# Patient Record
Sex: FEMALE | Race: WHITE
[De-identification: names, ages, dates, MRNs, and addresses within clinical notes are randomized per-mention and may not be internally consistent; named-entity substitution may affect disease eponyms.]

---

## 2017-04-17 ENCOUNTER — HOSPITAL ENCOUNTER (INPATIENT)
Dept: HOSPITAL 35 - ER | Age: 82
LOS: 7 days | Discharge: ANOTHER HOSPITAL | DRG: 190 | End: 2017-04-24
Attending: INTERNAL MEDICINE | Admitting: INTERNAL MEDICINE
Payer: COMMERCIAL

## 2017-04-17 VITALS — DIASTOLIC BLOOD PRESSURE: 57 MMHG | SYSTOLIC BLOOD PRESSURE: 183 MMHG

## 2017-04-17 VITALS — SYSTOLIC BLOOD PRESSURE: 170 MMHG | DIASTOLIC BLOOD PRESSURE: 61 MMHG

## 2017-04-17 VITALS — DIASTOLIC BLOOD PRESSURE: 51 MMHG | SYSTOLIC BLOOD PRESSURE: 150 MMHG

## 2017-04-17 VITALS — SYSTOLIC BLOOD PRESSURE: 183 MMHG | DIASTOLIC BLOOD PRESSURE: 59 MMHG

## 2017-04-17 VITALS — BODY MASS INDEX: 31.63 KG/M2 | HEIGHT: 62.99 IN | WEIGHT: 178.5 LBS

## 2017-04-17 DIAGNOSIS — J18.9: ICD-10-CM

## 2017-04-17 DIAGNOSIS — E44.1: ICD-10-CM

## 2017-04-17 DIAGNOSIS — Z86.73: ICD-10-CM

## 2017-04-17 DIAGNOSIS — E78.5: ICD-10-CM

## 2017-04-17 DIAGNOSIS — J45.901: ICD-10-CM

## 2017-04-17 DIAGNOSIS — E03.9: ICD-10-CM

## 2017-04-17 DIAGNOSIS — E11.9: ICD-10-CM

## 2017-04-17 DIAGNOSIS — K21.9: ICD-10-CM

## 2017-04-17 DIAGNOSIS — I50.32: ICD-10-CM

## 2017-04-17 DIAGNOSIS — Z88.2: ICD-10-CM

## 2017-04-17 DIAGNOSIS — G89.29: ICD-10-CM

## 2017-04-17 DIAGNOSIS — F32.9: ICD-10-CM

## 2017-04-17 DIAGNOSIS — Z88.1: ICD-10-CM

## 2017-04-17 DIAGNOSIS — Z88.0: ICD-10-CM

## 2017-04-17 DIAGNOSIS — I11.0: ICD-10-CM

## 2017-04-17 DIAGNOSIS — Z88.6: ICD-10-CM

## 2017-04-17 DIAGNOSIS — J44.1: ICD-10-CM

## 2017-04-17 DIAGNOSIS — J20.9: ICD-10-CM

## 2017-04-17 DIAGNOSIS — F41.9: ICD-10-CM

## 2017-04-17 DIAGNOSIS — J44.0: Primary | ICD-10-CM

## 2017-04-17 LAB
ALBUMIN SERPL-MCNC: 2.9 G/DL (ref 3.4–5)
ALP SERPL-CCNC: 59 U/L (ref 46–116)
ALT SERPL-CCNC: 9 U/L (ref 30–65)
ANION GAP SERPL CALC-SCNC: 13 MMOL/L (ref 7–16)
AST SERPL-CCNC: 11 U/L (ref 15–37)
BILIRUB SERPL-MCNC: 0.2 MG/DL
BUN SERPL-MCNC: 18 MG/DL (ref 7–18)
CALCIUM SERPL-MCNC: 9.7 MG/DL (ref 8.5–10.1)
CHLORIDE SERPL-SCNC: 105 MMOL/L (ref 98–107)
CO2 SERPL-SCNC: 19 MMOL/L (ref 21–32)
CREAT SERPL-MCNC: 1.1 MG/DL (ref 0.6–1)
EOSINOPHIL NFR BLD: 7 % (ref 0–3)
ERYTHROCYTE [DISTWIDTH] IN BLOOD BY AUTOMATED COUNT: 15.2 % (ref 10.5–14.5)
GLUCOSE SERPL-MCNC: 153 MG/DL (ref 74–106)
GRANULOCYTES NFR BLD MANUAL: 52 % (ref 36–66)
HCT VFR BLD CALC: 30.4 % (ref 37–47)
HGB BLD-MCNC: 10.2 GM/DL (ref 12–15)
LYMPHOCYTES NFR BLD AUTO: 30 % (ref 24–44)
MANUAL DIFFERENTIAL PERFORMED BLD QL: YES
MCH RBC QN AUTO: 30.7 PG (ref 26–34)
MCHC RBC AUTO-ENTMCNC: 33.5 G/DL (ref 28–37)
MCV RBC: 91.7 FL (ref 80–100)
MONOCYTES NFR BLD: 10 % (ref 1–8)
NEUTROPHILS # BLD: 4.5 THOU/UL (ref 1.4–8.2)
NEUTS BAND NFR BLD: 1 % (ref 0–8)
PLATELET # BLD: 295 THOU/UL (ref 150–400)
POTASSIUM SERPL-SCNC: 4.4 MMOL/L (ref 3.5–5.1)
PROT SERPL-MCNC: 7.7 G/DL (ref 6.4–8.2)
RBC # BLD AUTO: 3.31 MIL/UL (ref 4.2–5)
SODIUM SERPL-SCNC: 137 MMOL/L (ref 136–145)
TOTAL CELL COUNT: 100
TROPONIN I SERPL-MCNC: < 0.04 NG/ML
WBC # BLD AUTO: 8.4 THOU/UL (ref 4–11)

## 2017-04-17 PROCEDURE — 10045: CPT

## 2017-04-18 VITALS — SYSTOLIC BLOOD PRESSURE: 142 MMHG | DIASTOLIC BLOOD PRESSURE: 53 MMHG

## 2017-04-18 VITALS — SYSTOLIC BLOOD PRESSURE: 136 MMHG | DIASTOLIC BLOOD PRESSURE: 62 MMHG

## 2017-04-18 VITALS — SYSTOLIC BLOOD PRESSURE: 152 MMHG | DIASTOLIC BLOOD PRESSURE: 62 MMHG

## 2017-04-18 VITALS — DIASTOLIC BLOOD PRESSURE: 57 MMHG | SYSTOLIC BLOOD PRESSURE: 137 MMHG

## 2017-04-18 VITALS — DIASTOLIC BLOOD PRESSURE: 47 MMHG | SYSTOLIC BLOOD PRESSURE: 130 MMHG

## 2017-04-19 VITALS — SYSTOLIC BLOOD PRESSURE: 136 MMHG | DIASTOLIC BLOOD PRESSURE: 53 MMHG

## 2017-04-19 VITALS — DIASTOLIC BLOOD PRESSURE: 47 MMHG | SYSTOLIC BLOOD PRESSURE: 125 MMHG

## 2017-04-19 VITALS — SYSTOLIC BLOOD PRESSURE: 112 MMHG | DIASTOLIC BLOOD PRESSURE: 45 MMHG

## 2017-04-19 VITALS — DIASTOLIC BLOOD PRESSURE: 49 MMHG | SYSTOLIC BLOOD PRESSURE: 131 MMHG

## 2017-04-19 VITALS — SYSTOLIC BLOOD PRESSURE: 141 MMHG | DIASTOLIC BLOOD PRESSURE: 55 MMHG

## 2017-04-19 LAB
ANION GAP SERPL CALC-SCNC: 12 MMOL/L (ref 7–16)
BUN SERPL-MCNC: 15 MG/DL (ref 7–18)
CALCIUM SERPL-MCNC: 9 MG/DL (ref 8.5–10.1)
CHLORIDE SERPL-SCNC: 103 MMOL/L (ref 98–107)
CO2 SERPL-SCNC: 20 MMOL/L (ref 21–32)
CREAT SERPL-MCNC: 0.9 MG/DL (ref 0.6–1)
ERYTHROCYTE [DISTWIDTH] IN BLOOD BY AUTOMATED COUNT: 15 % (ref 10.5–14.5)
GLUCOSE SERPL-MCNC: 250 MG/DL (ref 74–106)
HCT VFR BLD CALC: 25.9 % (ref 37–47)
HGB BLD-MCNC: 8.6 GM/DL (ref 12–15)
MCH RBC QN AUTO: 30.4 PG (ref 26–34)
MCHC RBC AUTO-ENTMCNC: 33.3 G/DL (ref 28–37)
MCV RBC: 91.2 FL (ref 80–100)
PLATELET # BLD: 281 THOU/UL (ref 150–400)
POTASSIUM SERPL-SCNC: 4.4 MMOL/L (ref 3.5–5.1)
RBC # BLD AUTO: 2.83 MIL/UL (ref 4.2–5)
SODIUM SERPL-SCNC: 135 MMOL/L (ref 136–145)
WBC # BLD AUTO: 7.8 THOU/UL (ref 4–11)

## 2017-04-20 VITALS — SYSTOLIC BLOOD PRESSURE: 142 MMHG | DIASTOLIC BLOOD PRESSURE: 53 MMHG

## 2017-04-20 VITALS — SYSTOLIC BLOOD PRESSURE: 144 MMHG | DIASTOLIC BLOOD PRESSURE: 51 MMHG

## 2017-04-20 VITALS — SYSTOLIC BLOOD PRESSURE: 140 MMHG | DIASTOLIC BLOOD PRESSURE: 46 MMHG

## 2017-04-20 VITALS — SYSTOLIC BLOOD PRESSURE: 99 MMHG | DIASTOLIC BLOOD PRESSURE: 70 MMHG

## 2017-04-20 VITALS — SYSTOLIC BLOOD PRESSURE: 150 MMHG | DIASTOLIC BLOOD PRESSURE: 42 MMHG

## 2017-04-20 VITALS — DIASTOLIC BLOOD PRESSURE: 43 MMHG | SYSTOLIC BLOOD PRESSURE: 136 MMHG

## 2017-04-20 LAB
ANION GAP SERPL CALC-SCNC: 10 MMOL/L (ref 7–16)
BUN SERPL-MCNC: 22 MG/DL (ref 7–18)
CALCIUM SERPL-MCNC: 8.8 MG/DL (ref 8.5–10.1)
CHLORIDE SERPL-SCNC: 104 MMOL/L (ref 98–107)
CO2 SERPL-SCNC: 23 MMOL/L (ref 21–32)
CREAT SERPL-MCNC: 1 MG/DL (ref 0.6–1)
ERYTHROCYTE [DISTWIDTH] IN BLOOD BY AUTOMATED COUNT: 15.2 % (ref 10.5–14.5)
GLUCOSE SERPL-MCNC: 245 MG/DL (ref 74–106)
HCT VFR BLD CALC: 27 % (ref 37–47)
HGB BLD-MCNC: 8.9 GM/DL (ref 12–15)
MCH RBC QN AUTO: 30 PG (ref 26–34)
MCHC RBC AUTO-ENTMCNC: 32.9 G/DL (ref 28–37)
MCV RBC: 91.1 FL (ref 80–100)
PLATELET # BLD: 286 THOU/UL (ref 150–400)
POTASSIUM SERPL-SCNC: 4.3 MMOL/L (ref 3.5–5.1)
RBC # BLD AUTO: 2.96 MIL/UL (ref 4.2–5)
SODIUM SERPL-SCNC: 137 MMOL/L (ref 136–145)
WBC # BLD AUTO: 9.4 THOU/UL (ref 4–11)

## 2017-04-21 VITALS — SYSTOLIC BLOOD PRESSURE: 153 MMHG | DIASTOLIC BLOOD PRESSURE: 49 MMHG

## 2017-04-21 VITALS — DIASTOLIC BLOOD PRESSURE: 59 MMHG | SYSTOLIC BLOOD PRESSURE: 140 MMHG

## 2017-04-21 VITALS — SYSTOLIC BLOOD PRESSURE: 151 MMHG | DIASTOLIC BLOOD PRESSURE: 60 MMHG

## 2017-04-21 VITALS — DIASTOLIC BLOOD PRESSURE: 53 MMHG | SYSTOLIC BLOOD PRESSURE: 157 MMHG

## 2017-04-21 LAB
ANION GAP SERPL CALC-SCNC: 8 MMOL/L (ref 7–16)
BUN SERPL-MCNC: 23 MG/DL (ref 7–18)
CALCIUM SERPL-MCNC: 8.6 MG/DL (ref 8.5–10.1)
CHLORIDE SERPL-SCNC: 103 MMOL/L (ref 98–107)
CO2 SERPL-SCNC: 26 MMOL/L (ref 21–32)
CREAT SERPL-MCNC: 0.9 MG/DL (ref 0.6–1)
ERYTHROCYTE [DISTWIDTH] IN BLOOD BY AUTOMATED COUNT: 15 % (ref 10.5–14.5)
GLUCOSE SERPL-MCNC: 280 MG/DL (ref 74–106)
HCT VFR BLD CALC: 26.5 % (ref 37–47)
HGB BLD-MCNC: 8.9 GM/DL (ref 12–15)
MCH RBC QN AUTO: 30.3 PG (ref 26–34)
MCHC RBC AUTO-ENTMCNC: 33.6 G/DL (ref 28–37)
MCV RBC: 90.2 FL (ref 80–100)
PLATELET # BLD: 340 THOU/UL (ref 150–400)
POTASSIUM SERPL-SCNC: 3.9 MMOL/L (ref 3.5–5.1)
RBC # BLD AUTO: 2.94 MIL/UL (ref 4.2–5)
SODIUM SERPL-SCNC: 137 MMOL/L (ref 136–145)
WBC # BLD AUTO: 8.4 THOU/UL (ref 4–11)

## 2017-04-22 VITALS — DIASTOLIC BLOOD PRESSURE: 58 MMHG | SYSTOLIC BLOOD PRESSURE: 156 MMHG

## 2017-04-22 VITALS — DIASTOLIC BLOOD PRESSURE: 43 MMHG | SYSTOLIC BLOOD PRESSURE: 147 MMHG

## 2017-04-22 VITALS — DIASTOLIC BLOOD PRESSURE: 48 MMHG | SYSTOLIC BLOOD PRESSURE: 177 MMHG

## 2017-04-22 VITALS — DIASTOLIC BLOOD PRESSURE: 44 MMHG | SYSTOLIC BLOOD PRESSURE: 147 MMHG

## 2017-04-22 VITALS — SYSTOLIC BLOOD PRESSURE: 170 MMHG | DIASTOLIC BLOOD PRESSURE: 47 MMHG

## 2017-04-22 VITALS — SYSTOLIC BLOOD PRESSURE: 155 MMHG | DIASTOLIC BLOOD PRESSURE: 41 MMHG

## 2017-04-22 LAB
ANION GAP SERPL CALC-SCNC: 8 MMOL/L (ref 7–16)
BUN SERPL-MCNC: 24 MG/DL (ref 7–18)
CALCIUM SERPL-MCNC: 8.6 MG/DL (ref 8.5–10.1)
CHLORIDE SERPL-SCNC: 101 MMOL/L (ref 98–107)
CO2 SERPL-SCNC: 27 MMOL/L (ref 21–32)
CREAT SERPL-MCNC: 1 MG/DL (ref 0.6–1)
GLUCOSE SERPL-MCNC: 250 MG/DL (ref 74–106)
POTASSIUM SERPL-SCNC: 4 MMOL/L (ref 3.5–5.1)
SODIUM SERPL-SCNC: 136 MMOL/L (ref 136–145)

## 2017-04-23 VITALS — SYSTOLIC BLOOD PRESSURE: 161 MMHG | DIASTOLIC BLOOD PRESSURE: 64 MMHG

## 2017-04-23 VITALS — DIASTOLIC BLOOD PRESSURE: 62 MMHG | SYSTOLIC BLOOD PRESSURE: 166 MMHG

## 2017-04-23 VITALS — SYSTOLIC BLOOD PRESSURE: 167 MMHG | DIASTOLIC BLOOD PRESSURE: 47 MMHG

## 2017-04-23 VITALS — SYSTOLIC BLOOD PRESSURE: 157 MMHG | DIASTOLIC BLOOD PRESSURE: 62 MMHG

## 2017-04-23 LAB
ANION GAP SERPL CALC-SCNC: 7 MMOL/L (ref 7–16)
BUN SERPL-MCNC: 26 MG/DL (ref 7–18)
CALCIUM SERPL-MCNC: 8.8 MG/DL (ref 8.5–10.1)
CHLORIDE SERPL-SCNC: 99 MMOL/L (ref 98–107)
CO2 SERPL-SCNC: 28 MMOL/L (ref 21–32)
CREAT SERPL-MCNC: 0.9 MG/DL (ref 0.6–1)
ERYTHROCYTE [DISTWIDTH] IN BLOOD BY AUTOMATED COUNT: 15.2 % (ref 10.5–14.5)
GLUCOSE SERPL-MCNC: 236 MG/DL (ref 74–106)
HCT VFR BLD CALC: 28.1 % (ref 37–47)
HGB BLD-MCNC: 9.4 GM/DL (ref 12–15)
MCH RBC QN AUTO: 30.5 PG (ref 26–34)
MCHC RBC AUTO-ENTMCNC: 33.6 G/DL (ref 28–37)
MCV RBC: 90.6 FL (ref 80–100)
PLATELET # BLD: 355 THOU/UL (ref 150–400)
POTASSIUM SERPL-SCNC: 4.3 MMOL/L (ref 3.5–5.1)
RBC # BLD AUTO: 3.1 MIL/UL (ref 4.2–5)
SODIUM SERPL-SCNC: 134 MMOL/L (ref 136–145)
WBC # BLD AUTO: 8.4 THOU/UL (ref 4–11)

## 2017-04-24 VITALS — SYSTOLIC BLOOD PRESSURE: 138 MMHG | DIASTOLIC BLOOD PRESSURE: 54 MMHG

## 2017-04-24 VITALS — DIASTOLIC BLOOD PRESSURE: 37 MMHG | SYSTOLIC BLOOD PRESSURE: 141 MMHG

## 2017-04-24 VITALS — DIASTOLIC BLOOD PRESSURE: 44 MMHG | SYSTOLIC BLOOD PRESSURE: 148 MMHG

## 2017-04-24 LAB
ANION GAP SERPL CALC-SCNC: 8 MMOL/L (ref 7–16)
BUN SERPL-MCNC: 25 MG/DL (ref 7–18)
CALCIUM SERPL-MCNC: 8.5 MG/DL (ref 8.5–10.1)
CHLORIDE SERPL-SCNC: 98 MMOL/L (ref 98–107)
CO2 SERPL-SCNC: 28 MMOL/L (ref 21–32)
CREAT SERPL-MCNC: 1 MG/DL (ref 0.6–1)
GLUCOSE SERPL-MCNC: 254 MG/DL (ref 74–106)
POTASSIUM SERPL-SCNC: 4.1 MMOL/L (ref 3.5–5.1)
SODIUM SERPL-SCNC: 134 MMOL/L (ref 136–145)

## 2017-08-14 ENCOUNTER — HOSPITAL ENCOUNTER (EMERGENCY)
Dept: HOSPITAL 35 - ER | Age: 82
LOS: 1 days | Discharge: HOME | End: 2017-08-15
Payer: COMMERCIAL

## 2017-08-14 VITALS — BODY MASS INDEX: 35.48 KG/M2 | HEIGHT: 58 IN | WEIGHT: 169.01 LBS

## 2017-08-14 DIAGNOSIS — Z88.5: ICD-10-CM

## 2017-08-14 DIAGNOSIS — Z88.2: ICD-10-CM

## 2017-08-14 DIAGNOSIS — T78.1XXA: Primary | ICD-10-CM

## 2017-08-14 DIAGNOSIS — E03.9: ICD-10-CM

## 2017-08-14 DIAGNOSIS — I10: ICD-10-CM
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
## 2017-08-14 DIAGNOSIS — Z88.4: ICD-10-CM

## 2017-08-14 DIAGNOSIS — Z88.1: ICD-10-CM

## 2017-08-14 DIAGNOSIS — K21.9: ICD-10-CM

## 2017-08-14 DIAGNOSIS — F41.9: ICD-10-CM

## 2017-08-14 DIAGNOSIS — E78.00: ICD-10-CM

## 2017-08-14 DIAGNOSIS — Z88.0: ICD-10-CM

## 2017-08-14 DIAGNOSIS — F32.9: ICD-10-CM

## 2017-08-14 DIAGNOSIS — E11.9: ICD-10-CM

## 2017-08-14 DIAGNOSIS — R60.9: ICD-10-CM

## 2017-08-14 LAB
ALBUMIN SERPL-MCNC: 3 G/DL (ref 3.4–5)
ALP SERPL-CCNC: 66 U/L (ref 46–116)
ALT SERPL-CCNC: 14 U/L (ref 30–65)
ANION GAP SERPL CALC-SCNC: 10 MMOL/L (ref 7–16)
AST SERPL-CCNC: 18 U/L (ref 15–37)
BILIRUB SERPL-MCNC: 0.3 MG/DL
BILIRUB UR-MCNC: NEGATIVE MG/DL
BUN SERPL-MCNC: 23 MG/DL (ref 7–18)
CALCIUM SERPL-MCNC: 9.8 MG/DL (ref 8.5–10.1)
CHLORIDE SERPL-SCNC: 105 MMOL/L (ref 98–107)
CO2 SERPL-SCNC: 24 MMOL/L (ref 21–32)
COLOR UR: YELLOW
CREAT SERPL-MCNC: 1 MG/DL (ref 0.6–1)
ERYTHROCYTE [DISTWIDTH] IN BLOOD BY AUTOMATED COUNT: 14.3 % (ref 10.5–14.5)
GLUCOSE SERPL-MCNC: 107 MG/DL (ref 74–106)
HCT VFR BLD CALC: 28.9 % (ref 37–47)
HGB BLD-MCNC: 9.7 GM/DL (ref 12–15)
KETONES UR STRIP-MCNC: NEGATIVE MG/DL
MCH RBC QN AUTO: 29.9 PG (ref 26–34)
MCHC RBC AUTO-ENTMCNC: 33.6 G/DL (ref 28–37)
MCV RBC: 89.1 FL (ref 80–100)
PLATELET # BLD: 191 THOU/UL (ref 150–400)
POTASSIUM SERPL-SCNC: 4.4 MMOL/L (ref 3.5–5.1)
PROT SERPL-MCNC: 6.5 G/DL (ref 6.4–8.2)
RBC # BLD AUTO: 3.24 MIL/UL (ref 4.2–5)
RBC # UR STRIP: NEGATIVE /UL
RBC #/AREA URNS HPF: (no result) /HPF (ref 0–2)
SODIUM SERPL-SCNC: 139 MMOL/L (ref 136–145)
SP GR UR STRIP: 1.01 (ref 1–1.03)
SQUAMOUS: (no result) /LPF (ref 0–3)
TROPONIN I SERPL-MCNC: < 0.04 NG/ML
URINE GLUCOSE-RANDOM*: NEGATIVE
URINE LEUKOCYTES-REFLEX: (no result)
URINE PROTEIN (DIPSTICK): NEGATIVE
URINE WBC-REFLEX: (no result) /HPF (ref 0–5)
UROBILINOGEN UR STRIP-ACNC: 0.2 E.U./DL (ref 0.2–1)
WBC # BLD AUTO: 6.3 THOU/UL (ref 4–11)

## 2018-01-19 ENCOUNTER — HOSPITAL ENCOUNTER (INPATIENT)
Dept: HOSPITAL 35 - ER | Age: 83
LOS: 10 days | Discharge: NURSING, REHAB OR LONG-TERM CARE | DRG: 193 | End: 2018-01-29
Attending: INTERNAL MEDICINE | Admitting: INTERNAL MEDICINE
Payer: COMMERCIAL

## 2018-01-19 VITALS — SYSTOLIC BLOOD PRESSURE: 136 MMHG | DIASTOLIC BLOOD PRESSURE: 44 MMHG

## 2018-01-19 VITALS — DIASTOLIC BLOOD PRESSURE: 47 MMHG | SYSTOLIC BLOOD PRESSURE: 149 MMHG

## 2018-01-19 VITALS — WEIGHT: 152.01 LBS | BODY MASS INDEX: 21.76 KG/M2 | HEIGHT: 70 IN

## 2018-01-19 VITALS — DIASTOLIC BLOOD PRESSURE: 45 MMHG | SYSTOLIC BLOOD PRESSURE: 129 MMHG

## 2018-01-19 DIAGNOSIS — J45.901: ICD-10-CM

## 2018-01-19 DIAGNOSIS — I10: ICD-10-CM

## 2018-01-19 DIAGNOSIS — Z88.2: ICD-10-CM

## 2018-01-19 DIAGNOSIS — E78.5: ICD-10-CM

## 2018-01-19 DIAGNOSIS — E03.9: ICD-10-CM

## 2018-01-19 DIAGNOSIS — E11.9: ICD-10-CM

## 2018-01-19 DIAGNOSIS — G89.29: ICD-10-CM

## 2018-01-19 DIAGNOSIS — Z88.1: ICD-10-CM

## 2018-01-19 DIAGNOSIS — K21.9: ICD-10-CM

## 2018-01-19 DIAGNOSIS — Z88.0: ICD-10-CM

## 2018-01-19 DIAGNOSIS — Z87.891: ICD-10-CM

## 2018-01-19 DIAGNOSIS — F41.9: ICD-10-CM

## 2018-01-19 DIAGNOSIS — Z79.899: ICD-10-CM

## 2018-01-19 DIAGNOSIS — F32.9: ICD-10-CM

## 2018-01-19 DIAGNOSIS — J18.9: Primary | ICD-10-CM

## 2018-01-19 DIAGNOSIS — Z88.5: ICD-10-CM

## 2018-01-19 DIAGNOSIS — J96.00: ICD-10-CM

## 2018-01-19 LAB
ALBUMIN SERPL-MCNC: 2.9 G/DL (ref 3.4–5)
ALT SERPL-CCNC: 18 U/L (ref 30–65)
ANION GAP SERPL CALC-SCNC: 7 MMOL/L (ref 7–16)
AST SERPL-CCNC: 20 U/L (ref 15–37)
BILIRUB SERPL-MCNC: 0.2 MG/DL
BILIRUB UR-MCNC: NEGATIVE MG/DL
BUN SERPL-MCNC: 27 MG/DL (ref 7–18)
CALCIUM SERPL-MCNC: 9.7 MG/DL (ref 8.5–10.1)
CHLORIDE SERPL-SCNC: 103 MMOL/L (ref 98–107)
CO2 SERPL-SCNC: 26 MMOL/L (ref 21–32)
COLOR UR: YELLOW
CREAT SERPL-MCNC: 1.3 MG/DL (ref 0.6–1)
ERYTHROCYTE [DISTWIDTH] IN BLOOD BY AUTOMATED COUNT: 14 % (ref 10.5–14.5)
GLUCOSE SERPL-MCNC: 98 MG/DL (ref 74–106)
HCT VFR BLD CALC: 30.1 % (ref 37–47)
HGB BLD-MCNC: 9.9 GM/DL (ref 12–15)
KETONES UR STRIP-MCNC: NEGATIVE MG/DL
MCH RBC QN AUTO: 28.8 PG (ref 26–34)
MCHC RBC AUTO-ENTMCNC: 32.8 G/DL (ref 28–37)
MCV RBC: 87.9 FL (ref 80–100)
PLATELET # BLD: 372 THOU/UL (ref 150–400)
POTASSIUM SERPL-SCNC: 5 MMOL/L (ref 3.5–5.1)
PROT SERPL-MCNC: 6.8 G/DL (ref 6.4–8.2)
RBC # BLD AUTO: 3.43 MIL/UL (ref 4.2–5)
RBC # UR STRIP: NEGATIVE /UL
SODIUM SERPL-SCNC: 136 MMOL/L (ref 136–145)
SP GR UR STRIP: 1.01 (ref 1–1.03)
TROPONIN I SERPL-MCNC: < 0.04 NG/ML (ref ?–0.06)
URINE CLARITY: CLEAR
URINE GLUCOSE-RANDOM*: NEGATIVE
URINE LEUKOCYTES-REFLEX: NEGATIVE
URINE NITRITE-REFLEX: NEGATIVE
URINE PROTEIN (DIPSTICK): NEGATIVE
UROBILINOGEN UR STRIP-ACNC: 0.2 E.U./DL (ref 0.2–1)
WBC # BLD AUTO: 12.3 THOU/UL (ref 4–11)

## 2018-01-19 PROCEDURE — 10081 I&D PILONIDAL CYST COMP: CPT

## 2018-01-19 PROCEDURE — 10100: CPT

## 2018-01-20 VITALS — SYSTOLIC BLOOD PRESSURE: 135 MMHG | DIASTOLIC BLOOD PRESSURE: 43 MMHG

## 2018-01-20 VITALS — DIASTOLIC BLOOD PRESSURE: 61 MMHG | SYSTOLIC BLOOD PRESSURE: 155 MMHG

## 2018-01-20 VITALS — SYSTOLIC BLOOD PRESSURE: 153 MMHG | DIASTOLIC BLOOD PRESSURE: 40 MMHG

## 2018-01-20 VITALS — SYSTOLIC BLOOD PRESSURE: 136 MMHG | DIASTOLIC BLOOD PRESSURE: 43 MMHG

## 2018-01-20 VITALS — DIASTOLIC BLOOD PRESSURE: 43 MMHG | SYSTOLIC BLOOD PRESSURE: 148 MMHG

## 2018-01-21 VITALS — SYSTOLIC BLOOD PRESSURE: 188 MMHG | DIASTOLIC BLOOD PRESSURE: 54 MMHG

## 2018-01-21 VITALS — DIASTOLIC BLOOD PRESSURE: 69 MMHG | SYSTOLIC BLOOD PRESSURE: 185 MMHG

## 2018-01-21 VITALS — SYSTOLIC BLOOD PRESSURE: 167 MMHG | DIASTOLIC BLOOD PRESSURE: 45 MMHG

## 2018-01-21 VITALS — SYSTOLIC BLOOD PRESSURE: 181 MMHG | DIASTOLIC BLOOD PRESSURE: 77 MMHG

## 2018-01-22 VITALS — SYSTOLIC BLOOD PRESSURE: 180 MMHG | DIASTOLIC BLOOD PRESSURE: 64 MMHG

## 2018-01-22 VITALS — DIASTOLIC BLOOD PRESSURE: 33 MMHG | SYSTOLIC BLOOD PRESSURE: 101 MMHG

## 2018-01-22 VITALS — DIASTOLIC BLOOD PRESSURE: 49 MMHG | SYSTOLIC BLOOD PRESSURE: 157 MMHG

## 2018-01-22 VITALS — DIASTOLIC BLOOD PRESSURE: 40 MMHG | SYSTOLIC BLOOD PRESSURE: 139 MMHG

## 2018-01-22 VITALS — DIASTOLIC BLOOD PRESSURE: 55 MMHG | SYSTOLIC BLOOD PRESSURE: 138 MMHG

## 2018-01-22 LAB
ANION GAP SERPL CALC-SCNC: 9 MMOL/L (ref 7–16)
BASOPHILS NFR BLD AUTO: 2.5 % (ref 0–2)
BUN SERPL-MCNC: 13 MG/DL (ref 7–18)
CALCIUM SERPL-MCNC: 9.1 MG/DL (ref 8.5–10.1)
CHLORIDE SERPL-SCNC: 106 MMOL/L (ref 98–107)
CO2 SERPL-SCNC: 25 MMOL/L (ref 21–32)
CREAT SERPL-MCNC: 0.9 MG/DL (ref 0.6–1)
EOSINOPHIL NFR BLD: 8.2 % (ref 0–3)
ERYTHROCYTE [DISTWIDTH] IN BLOOD BY AUTOMATED COUNT: 14.6 % (ref 10.5–14.5)
GLUCOSE SERPL-MCNC: 124 MG/DL (ref 74–106)
GRANULOCYTES NFR BLD MANUAL: 60.3 % (ref 36–66)
HCT VFR BLD CALC: 31.8 % (ref 37–47)
HGB BLD-MCNC: 10.5 GM/DL (ref 12–15)
LYMPHOCYTES NFR BLD AUTO: 17.1 % (ref 24–44)
MCH RBC QN AUTO: 29 PG (ref 26–34)
MCHC RBC AUTO-ENTMCNC: 33 G/DL (ref 28–37)
MCV RBC: 87.6 FL (ref 80–100)
MONOCYTES NFR BLD: 11.9 % (ref 1–8)
NEUTROPHILS # BLD: 5.5 THOU/UL (ref 1.4–8.2)
PLATELET # BLD: 378 THOU/UL (ref 150–400)
POTASSIUM SERPL-SCNC: 3.7 MMOL/L (ref 3.5–5.1)
RBC # BLD AUTO: 3.63 MIL/UL (ref 4.2–5)
SODIUM SERPL-SCNC: 140 MMOL/L (ref 136–145)
WBC # BLD AUTO: 9.2 THOU/UL (ref 4–11)

## 2018-01-23 VITALS — SYSTOLIC BLOOD PRESSURE: 153 MMHG | DIASTOLIC BLOOD PRESSURE: 64 MMHG

## 2018-01-23 VITALS — SYSTOLIC BLOOD PRESSURE: 159 MMHG | DIASTOLIC BLOOD PRESSURE: 60 MMHG

## 2018-01-23 VITALS — SYSTOLIC BLOOD PRESSURE: 129 MMHG | DIASTOLIC BLOOD PRESSURE: 43 MMHG

## 2018-01-23 VITALS — DIASTOLIC BLOOD PRESSURE: 40 MMHG | SYSTOLIC BLOOD PRESSURE: 110 MMHG

## 2018-01-24 VITALS — SYSTOLIC BLOOD PRESSURE: 117 MMHG | DIASTOLIC BLOOD PRESSURE: 43 MMHG

## 2018-01-24 VITALS — SYSTOLIC BLOOD PRESSURE: 148 MMHG | DIASTOLIC BLOOD PRESSURE: 47 MMHG

## 2018-01-24 VITALS — DIASTOLIC BLOOD PRESSURE: 42 MMHG | SYSTOLIC BLOOD PRESSURE: 179 MMHG

## 2018-01-24 VITALS — SYSTOLIC BLOOD PRESSURE: 163 MMHG | DIASTOLIC BLOOD PRESSURE: 38 MMHG

## 2018-01-24 LAB
ANION GAP SERPL CALC-SCNC: 13 MMOL/L (ref 7–16)
BE(VIVO): -0.6 MMOL/L
BUN SERPL-MCNC: 16 MG/DL (ref 7–18)
CALCIUM SERPL-MCNC: 9.7 MG/DL (ref 8.5–10.1)
CHLORIDE SERPL-SCNC: 105 MMOL/L (ref 98–107)
CO2 SERPL-SCNC: 20 MMOL/L (ref 21–32)
CREAT SERPL-MCNC: 0.8 MG/DL (ref 0.6–1)
GLUCOSE SERPL-MCNC: 144 MG/DL (ref 74–106)
HCO3 BLD-SCNC: 22.5 MMOL/L (ref 22–26)
PCO2 BLD: 32.1 MMHG (ref 35–45)
PO2 BLD: 62.1 MMHG (ref 80–100)
POTASSIUM SERPL-SCNC: 5.3 MMOL/L (ref 3.5–5.1)
SODIUM SERPL-SCNC: 138 MMOL/L (ref 136–145)

## 2018-01-25 VITALS — DIASTOLIC BLOOD PRESSURE: 44 MMHG | SYSTOLIC BLOOD PRESSURE: 137 MMHG

## 2018-01-25 VITALS — SYSTOLIC BLOOD PRESSURE: 133 MMHG | DIASTOLIC BLOOD PRESSURE: 55 MMHG

## 2018-01-25 VITALS — DIASTOLIC BLOOD PRESSURE: 48 MMHG | SYSTOLIC BLOOD PRESSURE: 179 MMHG

## 2018-01-25 LAB
ANION GAP SERPL CALC-SCNC: 11 MMOL/L (ref 7–16)
BUN SERPL-MCNC: 17 MG/DL (ref 7–18)
CALCIUM SERPL-MCNC: 10.1 MG/DL (ref 8.5–10.1)
CHLORIDE SERPL-SCNC: 104 MMOL/L (ref 98–107)
CO2 SERPL-SCNC: 25 MMOL/L (ref 21–32)
CREAT SERPL-MCNC: 0.9 MG/DL (ref 0.6–1)
ERYTHROCYTE [DISTWIDTH] IN BLOOD BY AUTOMATED COUNT: 14.3 % (ref 10.5–14.5)
GLUCOSE SERPL-MCNC: 79 MG/DL (ref 74–106)
HCT VFR BLD CALC: 33.1 % (ref 37–47)
HGB BLD-MCNC: 11 GM/DL (ref 12–15)
MCH RBC QN AUTO: 28.8 PG (ref 26–34)
MCHC RBC AUTO-ENTMCNC: 33.3 G/DL (ref 28–37)
MCV RBC: 86.5 FL (ref 80–100)
PLATELET # BLD: 433 THOU/UL (ref 150–400)
POTASSIUM SERPL-SCNC: 4 MMOL/L (ref 3.5–5.1)
RBC # BLD AUTO: 3.83 MIL/UL (ref 4.2–5)
SODIUM SERPL-SCNC: 140 MMOL/L (ref 136–145)
WBC # BLD AUTO: 9.6 THOU/UL (ref 4–11)

## 2018-01-26 VITALS — DIASTOLIC BLOOD PRESSURE: 40 MMHG | SYSTOLIC BLOOD PRESSURE: 161 MMHG

## 2018-01-26 VITALS — DIASTOLIC BLOOD PRESSURE: 50 MMHG | SYSTOLIC BLOOD PRESSURE: 179 MMHG

## 2018-01-26 VITALS — DIASTOLIC BLOOD PRESSURE: 40 MMHG | SYSTOLIC BLOOD PRESSURE: 147 MMHG

## 2018-01-26 VITALS — DIASTOLIC BLOOD PRESSURE: 73 MMHG | SYSTOLIC BLOOD PRESSURE: 183 MMHG

## 2018-01-26 VITALS — SYSTOLIC BLOOD PRESSURE: 160 MMHG | DIASTOLIC BLOOD PRESSURE: 57 MMHG

## 2018-01-26 LAB
ANION GAP SERPL CALC-SCNC: 9 MMOL/L (ref 7–16)
BUN SERPL-MCNC: 18 MG/DL (ref 7–18)
CALCIUM SERPL-MCNC: 9.6 MG/DL (ref 8.5–10.1)
CHLORIDE SERPL-SCNC: 106 MMOL/L (ref 98–107)
CO2 SERPL-SCNC: 27 MMOL/L (ref 21–32)
CREAT SERPL-MCNC: 0.9 MG/DL (ref 0.6–1)
ERYTHROCYTE [DISTWIDTH] IN BLOOD BY AUTOMATED COUNT: 14.8 % (ref 10.5–14.5)
GLUCOSE SERPL-MCNC: 83 MG/DL (ref 74–106)
HCT VFR BLD CALC: 31.9 % (ref 37–47)
HGB BLD-MCNC: 10.4 GM/DL (ref 12–15)
MCH RBC QN AUTO: 28.6 PG (ref 26–34)
MCHC RBC AUTO-ENTMCNC: 32.7 G/DL (ref 28–37)
MCV RBC: 87.5 FL (ref 80–100)
PLATELET # BLD: 347 THOU/UL (ref 150–400)
POTASSIUM SERPL-SCNC: 4.1 MMOL/L (ref 3.5–5.1)
RBC # BLD AUTO: 3.64 MIL/UL (ref 4.2–5)
SODIUM SERPL-SCNC: 142 MMOL/L (ref 136–145)
WBC # BLD AUTO: 8.2 THOU/UL (ref 4–11)

## 2018-01-27 VITALS — SYSTOLIC BLOOD PRESSURE: 187 MMHG | DIASTOLIC BLOOD PRESSURE: 74 MMHG

## 2018-01-27 VITALS — DIASTOLIC BLOOD PRESSURE: 55 MMHG | SYSTOLIC BLOOD PRESSURE: 141 MMHG

## 2018-01-27 VITALS — DIASTOLIC BLOOD PRESSURE: 57 MMHG | SYSTOLIC BLOOD PRESSURE: 152 MMHG

## 2018-01-27 VITALS — SYSTOLIC BLOOD PRESSURE: 132 MMHG | DIASTOLIC BLOOD PRESSURE: 42 MMHG

## 2018-01-27 VITALS — DIASTOLIC BLOOD PRESSURE: 65 MMHG | SYSTOLIC BLOOD PRESSURE: 169 MMHG

## 2018-01-28 VITALS — DIASTOLIC BLOOD PRESSURE: 56 MMHG | SYSTOLIC BLOOD PRESSURE: 149 MMHG

## 2018-01-28 VITALS — SYSTOLIC BLOOD PRESSURE: 158 MMHG | DIASTOLIC BLOOD PRESSURE: 55 MMHG

## 2018-01-28 VITALS — SYSTOLIC BLOOD PRESSURE: 175 MMHG | DIASTOLIC BLOOD PRESSURE: 60 MMHG

## 2018-01-28 VITALS — DIASTOLIC BLOOD PRESSURE: 46 MMHG | SYSTOLIC BLOOD PRESSURE: 140 MMHG

## 2018-01-28 VITALS — DIASTOLIC BLOOD PRESSURE: 53 MMHG | SYSTOLIC BLOOD PRESSURE: 186 MMHG

## 2018-01-29 VITALS — SYSTOLIC BLOOD PRESSURE: 145 MMHG | DIASTOLIC BLOOD PRESSURE: 62 MMHG

## 2018-01-29 VITALS — DIASTOLIC BLOOD PRESSURE: 53 MMHG | SYSTOLIC BLOOD PRESSURE: 147 MMHG

## 2018-01-29 LAB
ANION GAP SERPL CALC-SCNC: 9 MMOL/L (ref 7–16)
BUN SERPL-MCNC: 28 MG/DL (ref 7–18)
CALCIUM SERPL-MCNC: 10.3 MG/DL (ref 8.5–10.1)
CHLORIDE SERPL-SCNC: 104 MMOL/L (ref 98–107)
CO2 SERPL-SCNC: 24 MMOL/L (ref 21–32)
CREAT SERPL-MCNC: 1.1 MG/DL (ref 0.6–1)
ERYTHROCYTE [DISTWIDTH] IN BLOOD BY AUTOMATED COUNT: 14.8 % (ref 10.5–14.5)
GLUCOSE SERPL-MCNC: 166 MG/DL (ref 74–106)
HCT VFR BLD CALC: 35.3 % (ref 37–47)
HGB BLD-MCNC: 11.5 GM/DL (ref 12–15)
MCH RBC QN AUTO: 28.7 PG (ref 26–34)
MCHC RBC AUTO-ENTMCNC: 32.6 G/DL (ref 28–37)
MCV RBC: 88 FL (ref 80–100)
PLATELET # BLD: 450 THOU/UL (ref 150–400)
POTASSIUM SERPL-SCNC: 4.5 MMOL/L (ref 3.5–5.1)
RBC # BLD AUTO: 4.01 MIL/UL (ref 4.2–5)
SODIUM SERPL-SCNC: 137 MMOL/L (ref 136–145)
WBC # BLD AUTO: 12.4 THOU/UL (ref 4–11)

## 2018-02-16 ENCOUNTER — HOSPITAL ENCOUNTER (INPATIENT)
Dept: HOSPITAL 35 - ER | Age: 83
LOS: 3 days | Discharge: NURSING, REHAB OR LONG-TERM CARE | DRG: 312 | End: 2018-02-19
Attending: INTERNAL MEDICINE | Admitting: INTERNAL MEDICINE
Payer: COMMERCIAL

## 2018-02-16 VITALS — HEIGHT: 57.99 IN | WEIGHT: 148.7 LBS | BODY MASS INDEX: 31.21 KG/M2

## 2018-02-16 VITALS — SYSTOLIC BLOOD PRESSURE: 133 MMHG | DIASTOLIC BLOOD PRESSURE: 46 MMHG

## 2018-02-16 VITALS — DIASTOLIC BLOOD PRESSURE: 40 MMHG | SYSTOLIC BLOOD PRESSURE: 128 MMHG

## 2018-02-16 VITALS — DIASTOLIC BLOOD PRESSURE: 51 MMHG | SYSTOLIC BLOOD PRESSURE: 147 MMHG

## 2018-02-16 VITALS — DIASTOLIC BLOOD PRESSURE: 41 MMHG | SYSTOLIC BLOOD PRESSURE: 149 MMHG

## 2018-02-16 DIAGNOSIS — Z79.82: ICD-10-CM

## 2018-02-16 DIAGNOSIS — K21.9: ICD-10-CM
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
## 2018-02-16 DIAGNOSIS — G93.41: ICD-10-CM

## 2018-02-16 DIAGNOSIS — Z88.6: ICD-10-CM

## 2018-02-16 DIAGNOSIS — Z88.1: ICD-10-CM

## 2018-02-16 DIAGNOSIS — E11.9: ICD-10-CM
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
## 2018-02-16 DIAGNOSIS — I10: ICD-10-CM

## 2018-02-16 DIAGNOSIS — E87.6: ICD-10-CM

## 2018-02-16 DIAGNOSIS — E03.9: ICD-10-CM

## 2018-02-16 DIAGNOSIS — Z88.0: ICD-10-CM

## 2018-02-16 DIAGNOSIS — G89.29: ICD-10-CM

## 2018-02-16 DIAGNOSIS — Z88.2: ICD-10-CM

## 2018-02-16 DIAGNOSIS — E78.5: ICD-10-CM

## 2018-02-16 DIAGNOSIS — F41.9: ICD-10-CM

## 2018-02-16 DIAGNOSIS — J44.9: ICD-10-CM
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
## 2018-02-16 DIAGNOSIS — Z90.49: ICD-10-CM

## 2018-02-16 DIAGNOSIS — I95.1: Primary | ICD-10-CM

## 2018-02-16 DIAGNOSIS — F32.9: ICD-10-CM

## 2018-02-16 DIAGNOSIS — Z79.899: ICD-10-CM

## 2018-02-16 DIAGNOSIS — Z90.710: ICD-10-CM

## 2018-02-16 LAB
ANION GAP SERPL CALC-SCNC: 4 MMOL/L (ref 7–16)
BASOPHILS NFR BLD AUTO: 0.4 % (ref 0–2)
BILIRUB UR-MCNC: NEGATIVE MG/DL
BUN SERPL-MCNC: 38 MG/DL (ref 7–18)
CALCIUM SERPL-MCNC: 10 MG/DL (ref 8.5–10.1)
CHLORIDE SERPL-SCNC: 101 MMOL/L (ref 98–107)
CO2 SERPL-SCNC: 27 MMOL/L (ref 21–32)
COLOR UR: YELLOW
CREAT SERPL-MCNC: 1.3 MG/DL (ref 0.6–1)
EOSINOPHIL NFR BLD: 0.6 % (ref 0–3)
ERYTHROCYTE [DISTWIDTH] IN BLOOD BY AUTOMATED COUNT: 16.8 % (ref 10.5–14.5)
GLUCOSE SERPL-MCNC: 181 MG/DL (ref 74–106)
GRANULOCYTES NFR BLD MANUAL: 74.5 % (ref 36–66)
HCT VFR BLD CALC: 34.7 % (ref 37–47)
HGB BLD-MCNC: 11.3 GM/DL (ref 12–15)
KETONES UR STRIP-MCNC: NEGATIVE MG/DL
LYMPHOCYTES NFR BLD AUTO: 16.4 % (ref 24–44)
MCH RBC QN AUTO: 28.9 PG (ref 26–34)
MCHC RBC AUTO-ENTMCNC: 32.7 G/DL (ref 28–37)
MCV RBC: 88.4 FL (ref 80–100)
MONOCYTES NFR BLD: 8.1 % (ref 1–8)
NEUTROPHILS # BLD: 11 THOU/UL (ref 1.4–8.2)
PLATELET # BLD: 129 THOU/UL (ref 150–400)
POTASSIUM SERPL-SCNC: 6 MMOL/L (ref 3.5–5.1)
RBC # BLD AUTO: 3.92 MIL/UL (ref 4.2–5)
RBC # UR STRIP: NEGATIVE /UL
SODIUM SERPL-SCNC: 132 MMOL/L (ref 136–145)
SP GR UR STRIP: 1.01 (ref 1–1.03)
TROPONIN I SERPL-MCNC: < 0.04 NG/ML (ref ?–0.06)
URINE CLARITY: CLEAR
URINE GLUCOSE-RANDOM*: (no result)
URINE LEUKOCYTES-REFLEX: NEGATIVE
URINE NITRITE-REFLEX: NEGATIVE
URINE PROTEIN (DIPSTICK): NEGATIVE
UROBILINOGEN UR STRIP-ACNC: 0.2 E.U./DL (ref 0.2–1)
WBC # BLD AUTO: 14.8 THOU/UL (ref 4–11)

## 2018-02-16 PROCEDURE — 10100: CPT

## 2018-02-17 VITALS — SYSTOLIC BLOOD PRESSURE: 126 MMHG | DIASTOLIC BLOOD PRESSURE: 72 MMHG

## 2018-02-17 VITALS — SYSTOLIC BLOOD PRESSURE: 145 MMHG | DIASTOLIC BLOOD PRESSURE: 48 MMHG

## 2018-02-17 VITALS — DIASTOLIC BLOOD PRESSURE: 48 MMHG | SYSTOLIC BLOOD PRESSURE: 168 MMHG

## 2018-02-17 VITALS — DIASTOLIC BLOOD PRESSURE: 46 MMHG | SYSTOLIC BLOOD PRESSURE: 145 MMHG

## 2018-02-17 VITALS — SYSTOLIC BLOOD PRESSURE: 105 MMHG | DIASTOLIC BLOOD PRESSURE: 71 MMHG

## 2018-02-17 VITALS — DIASTOLIC BLOOD PRESSURE: 81 MMHG | SYSTOLIC BLOOD PRESSURE: 146 MMHG

## 2018-02-17 LAB
ANION GAP SERPL CALC-SCNC: 4 MMOL/L (ref 7–16)
BASOPHILS NFR BLD AUTO: 0.3 % (ref 0–2)
BUN SERPL-MCNC: 25 MG/DL (ref 7–18)
CALCIUM SERPL-MCNC: 8.9 MG/DL (ref 8.5–10.1)
CHLORIDE SERPL-SCNC: 101 MMOL/L (ref 98–107)
CO2 SERPL-SCNC: 27 MMOL/L (ref 21–32)
CREAT SERPL-MCNC: 1.1 MG/DL (ref 0.6–1)
EOSINOPHIL NFR BLD: 1.8 % (ref 0–3)
ERYTHROCYTE [DISTWIDTH] IN BLOOD BY AUTOMATED COUNT: 16.5 % (ref 10.5–14.5)
GLUCOSE SERPL-MCNC: 238 MG/DL (ref 74–106)
GRANULOCYTES NFR BLD MANUAL: 68.5 % (ref 36–66)
HCT VFR BLD CALC: 30.3 % (ref 37–47)
HGB BLD-MCNC: 10 GM/DL (ref 12–15)
LYMPHOCYTES NFR BLD AUTO: 19.1 % (ref 24–44)
MCH RBC QN AUTO: 29.1 PG (ref 26–34)
MCHC RBC AUTO-ENTMCNC: 32.9 G/DL (ref 28–37)
MCV RBC: 88.3 FL (ref 80–100)
MONOCYTES NFR BLD: 10.3 % (ref 1–8)
NEUTROPHILS # BLD: 7.6 THOU/UL (ref 1.4–8.2)
PLATELET # BLD: 110 THOU/UL (ref 150–400)
POTASSIUM SERPL-SCNC: 4.9 MMOL/L (ref 3.5–5.1)
RBC # BLD AUTO: 3.43 MIL/UL (ref 4.2–5)
SODIUM SERPL-SCNC: 132 MMOL/L (ref 136–145)
WBC # BLD AUTO: 11.1 THOU/UL (ref 4–11)

## 2018-02-18 VITALS — SYSTOLIC BLOOD PRESSURE: 154 MMHG | DIASTOLIC BLOOD PRESSURE: 34 MMHG

## 2018-02-18 VITALS — SYSTOLIC BLOOD PRESSURE: 152 MMHG | DIASTOLIC BLOOD PRESSURE: 63 MMHG

## 2018-02-18 VITALS — SYSTOLIC BLOOD PRESSURE: 172 MMHG | DIASTOLIC BLOOD PRESSURE: 53 MMHG

## 2018-02-18 VITALS — DIASTOLIC BLOOD PRESSURE: 93 MMHG | SYSTOLIC BLOOD PRESSURE: 142 MMHG

## 2018-02-18 VITALS — DIASTOLIC BLOOD PRESSURE: 49 MMHG | SYSTOLIC BLOOD PRESSURE: 155 MMHG

## 2018-02-19 VITALS — DIASTOLIC BLOOD PRESSURE: 55 MMHG | SYSTOLIC BLOOD PRESSURE: 163 MMHG

## 2018-02-19 VITALS — SYSTOLIC BLOOD PRESSURE: 163 MMHG | DIASTOLIC BLOOD PRESSURE: 55 MMHG

## 2018-02-19 VITALS — SYSTOLIC BLOOD PRESSURE: 150 MMHG | DIASTOLIC BLOOD PRESSURE: 59 MMHG

## 2018-06-07 ENCOUNTER — HOSPITAL ENCOUNTER (EMERGENCY)
Dept: HOSPITAL 35 - ER | Age: 83
Discharge: HOME | End: 2018-06-07
Payer: COMMERCIAL

## 2018-06-07 VITALS — BODY MASS INDEX: 32.54 KG/M2 | HEIGHT: 58 IN | WEIGHT: 155.01 LBS

## 2018-06-07 DIAGNOSIS — I10: ICD-10-CM

## 2018-06-07 DIAGNOSIS — E11.9: ICD-10-CM

## 2018-06-07 DIAGNOSIS — E78.5: ICD-10-CM

## 2018-06-07 DIAGNOSIS — Z88.0: ICD-10-CM

## 2018-06-07 DIAGNOSIS — M54.6: Primary | ICD-10-CM

## 2018-06-07 DIAGNOSIS — Z87.891: ICD-10-CM

## 2018-06-07 DIAGNOSIS — F41.9: ICD-10-CM

## 2018-06-07 DIAGNOSIS — M25.511: ICD-10-CM

## 2018-06-07 DIAGNOSIS — E03.9: ICD-10-CM

## 2018-06-07 DIAGNOSIS — F32.9: ICD-10-CM

## 2018-06-07 DIAGNOSIS — G89.29: ICD-10-CM

## 2018-06-07 DIAGNOSIS — Z88.2: ICD-10-CM

## 2018-06-07 LAB
ANION GAP SERPL CALC-SCNC: 7 MMOL/L (ref 7–16)
ANISOCYTOSIS BLD QL SMEAR: SLIGHT
BASOPHILS NFR BLD AUTO: 1 % (ref 0–2)
BUN SERPL-MCNC: 20 MG/DL (ref 7–18)
CALCIUM SERPL-MCNC: 10.2 MG/DL (ref 8.5–10.1)
CHLORIDE SERPL-SCNC: 103 MMOL/L (ref 98–107)
CO2 SERPL-SCNC: 24 MMOL/L (ref 21–32)
CREAT SERPL-MCNC: 1.1 MG/DL (ref 0.6–1)
EOSINOPHIL NFR BLD: 0 % (ref 0–3)
ERYTHROCYTE [DISTWIDTH] IN BLOOD BY AUTOMATED COUNT: 15.8 % (ref 10.5–14.5)
GLUCOSE SERPL-MCNC: 105 MG/DL (ref 74–106)
GRANULOCYTES NFR BLD MANUAL: 63 % (ref 36–66)
HCT VFR BLD CALC: 29.5 % (ref 37–47)
HGB BLD-MCNC: 9.6 GM/DL (ref 12–15)
LYMPHOCYTES NFR BLD AUTO: 25 % (ref 24–44)
MCH RBC QN AUTO: 28.2 PG (ref 26–34)
MCHC RBC AUTO-ENTMCNC: 32.6 G/DL (ref 28–37)
MCV RBC: 86.4 FL (ref 80–100)
MONOCYTES NFR BLD: 11 % (ref 1–8)
NEUTROPHILS # BLD: 3.7 THOU/UL (ref 1.4–8.2)
NEUTS BAND NFR BLD: 0 % (ref 0–8)
PLATELET # BLD: 296 THOU/UL (ref 150–400)
POTASSIUM SERPL-SCNC: 5 MMOL/L (ref 3.5–5.1)
RBC # BLD AUTO: 3.41 MIL/UL (ref 4.2–5)
SODIUM SERPL-SCNC: 134 MMOL/L (ref 136–145)
TROPONIN I SERPL-MCNC: < 0.04 NG/ML (ref ?–0.06)
WBC # BLD AUTO: 5.8 THOU/UL (ref 4–11)

## 2018-08-03 ENCOUNTER — HOSPITAL ENCOUNTER (EMERGENCY)
Dept: HOSPITAL 35 - ER | Age: 83
LOS: 1 days | Discharge: HOME | End: 2018-08-04
Payer: COMMERCIAL

## 2018-08-03 VITALS — BODY MASS INDEX: 33.17 KG/M2 | HEIGHT: 58 IN | WEIGHT: 158.01 LBS

## 2018-08-03 DIAGNOSIS — Z87.891: ICD-10-CM

## 2018-08-03 DIAGNOSIS — K21.9: ICD-10-CM

## 2018-08-03 DIAGNOSIS — Z88.0: ICD-10-CM

## 2018-08-03 DIAGNOSIS — E11.9: ICD-10-CM

## 2018-08-03 DIAGNOSIS — Z88.2: ICD-10-CM

## 2018-08-03 DIAGNOSIS — K80.20: Primary | ICD-10-CM

## 2018-08-03 DIAGNOSIS — I10: ICD-10-CM

## 2018-08-03 DIAGNOSIS — F41.9: ICD-10-CM

## 2018-08-03 DIAGNOSIS — F32.9: ICD-10-CM

## 2018-08-03 DIAGNOSIS — Z88.1: ICD-10-CM

## 2018-08-03 DIAGNOSIS — Z88.5: ICD-10-CM

## 2018-08-03 DIAGNOSIS — Z88.8: ICD-10-CM

## 2018-08-03 DIAGNOSIS — Z88.4: ICD-10-CM

## 2018-08-03 DIAGNOSIS — E03.9: ICD-10-CM

## 2018-08-03 DIAGNOSIS — G89.29: ICD-10-CM

## 2018-08-03 DIAGNOSIS — E78.5: ICD-10-CM

## 2018-08-04 LAB
ALBUMIN SERPL-MCNC: 3.5 G/DL (ref 3.4–5)
ALT SERPL-CCNC: 15 U/L (ref 30–65)
ANION GAP SERPL CALC-SCNC: 7 MMOL/L (ref 7–16)
AST SERPL-CCNC: 9 U/L (ref 15–37)
BACTERIA-REFLEX: (no result) /HPF
BASOPHILS NFR BLD AUTO: 1.3 % (ref 0–2)
BILIRUB DIRECT SERPL-MCNC: < 0.1 MG/DL
BILIRUB SERPL-MCNC: 0.2 MG/DL
BILIRUB UR-MCNC: NEGATIVE MG/DL
BUN SERPL-MCNC: 27 MG/DL (ref 7–18)
CALCIUM SERPL-MCNC: 8.9 MG/DL (ref 8.5–10.1)
CHLORIDE SERPL-SCNC: 103 MMOL/L (ref 98–107)
CO2 SERPL-SCNC: 26 MMOL/L (ref 21–32)
COLOR UR: YELLOW
CREAT SERPL-MCNC: 1 MG/DL (ref 0.6–1)
EOSINOPHIL NFR BLD: 4.6 % (ref 0–3)
ERYTHROCYTE [DISTWIDTH] IN BLOOD BY AUTOMATED COUNT: 17.5 % (ref 10.5–14.5)
GLUCOSE SERPL-MCNC: 96 MG/DL (ref 74–106)
GRANULOCYTES NFR BLD MANUAL: 52.1 % (ref 36–66)
HCT VFR BLD CALC: 33.7 % (ref 37–47)
HGB BLD-MCNC: 11.1 GM/DL (ref 12–15)
KETONES UR STRIP-MCNC: NEGATIVE MG/DL
LIPASE: 143 U/L (ref 73–393)
LYMPHOCYTES NFR BLD AUTO: 31.7 % (ref 24–44)
MCH RBC QN AUTO: 27.7 PG (ref 26–34)
MCHC RBC AUTO-ENTMCNC: 33 G/DL (ref 28–37)
MCV RBC: 84.1 FL (ref 80–100)
MONOCYTES NFR BLD: 10.3 % (ref 1–8)
NEUTROPHILS # BLD: 3.7 THOU/UL (ref 1.4–8.2)
PLATELET # BLD: 217 THOU/UL (ref 150–400)
POTASSIUM SERPL-SCNC: 4.3 MMOL/L (ref 3.5–5.1)
PROT SERPL-MCNC: 7.6 G/DL (ref 6.4–8.2)
RBC # BLD AUTO: 4.01 MIL/UL (ref 4.2–5)
RBC # UR STRIP: (no result) /UL
RBC #/AREA URNS HPF: (no result) /HPF (ref 0–2)
SODIUM SERPL-SCNC: 136 MMOL/L (ref 136–145)
SP GR UR STRIP: <= 1.005 (ref 1–1.03)
SQUAMOUS: (no result) /LPF (ref 0–3)
URINE CLARITY: CLEAR
URINE GLUCOSE-RANDOM*: NEGATIVE
URINE LEUKOCYTES-REFLEX: (no result)
URINE NITRITE-REFLEX: NEGATIVE
URINE PROTEIN (DIPSTICK): NEGATIVE
URINE WBC-REFLEX: (no result) /HPF (ref 0–5)
UROBILINOGEN UR STRIP-ACNC: 0.2 E.U./DL (ref 0.2–1)
WBC # BLD AUTO: 7.1 THOU/UL (ref 4–11)

## 2019-02-23 ENCOUNTER — HOSPITAL ENCOUNTER (INPATIENT)
Dept: HOSPITAL 35 - ER | Age: 84
LOS: 6 days | Discharge: NURSING, REHAB OR LONG-TERM CARE | DRG: 391 | End: 2019-03-01
Attending: INTERNAL MEDICINE | Admitting: INTERNAL MEDICINE
Payer: COMMERCIAL

## 2019-02-23 VITALS — DIASTOLIC BLOOD PRESSURE: 47 MMHG | SYSTOLIC BLOOD PRESSURE: 166 MMHG

## 2019-02-23 VITALS — HEIGHT: 64.02 IN | BODY MASS INDEX: 29.14 KG/M2 | WEIGHT: 170.7 LBS

## 2019-02-23 DIAGNOSIS — I10: ICD-10-CM

## 2019-02-23 DIAGNOSIS — G92: ICD-10-CM

## 2019-02-23 DIAGNOSIS — Z79.899: ICD-10-CM

## 2019-02-23 DIAGNOSIS — Z79.82: ICD-10-CM

## 2019-02-23 DIAGNOSIS — Z90.710: ICD-10-CM

## 2019-02-23 DIAGNOSIS — E78.5: ICD-10-CM

## 2019-02-23 DIAGNOSIS — G89.29: ICD-10-CM

## 2019-02-23 DIAGNOSIS — K52.9: Primary | ICD-10-CM

## 2019-02-23 DIAGNOSIS — E11.9: ICD-10-CM

## 2019-02-23 DIAGNOSIS — Z88.2: ICD-10-CM

## 2019-02-23 DIAGNOSIS — Z88.0: ICD-10-CM

## 2019-02-23 DIAGNOSIS — F41.9: ICD-10-CM

## 2019-02-23 DIAGNOSIS — D72.829: ICD-10-CM

## 2019-02-23 DIAGNOSIS — F32.9: ICD-10-CM

## 2019-02-23 DIAGNOSIS — Z88.8: ICD-10-CM

## 2019-02-23 DIAGNOSIS — E89.0: ICD-10-CM

## 2019-02-23 DIAGNOSIS — Z88.6: ICD-10-CM

## 2019-02-23 DIAGNOSIS — J44.9: ICD-10-CM

## 2019-02-23 DIAGNOSIS — Z87.81: ICD-10-CM

## 2019-02-23 DIAGNOSIS — Z87.891: ICD-10-CM

## 2019-02-23 DIAGNOSIS — K21.9: ICD-10-CM

## 2019-02-23 DIAGNOSIS — J45.901: ICD-10-CM

## 2019-02-23 DIAGNOSIS — I25.10: ICD-10-CM

## 2019-02-23 LAB
ANION GAP SERPL CALC-SCNC: 9 MMOL/L (ref 7–16)
ANISOCYTOSIS BLD QL SMEAR: (no result)
BE(VIVO): -3.4 MMOL/L
BILIRUB UR-MCNC: NEGATIVE MG/DL
BUN SERPL-MCNC: 31 MG/DL (ref 7–18)
CALCIUM SERPL-MCNC: 9.8 MG/DL (ref 8.5–10.1)
CHLORIDE SERPL-SCNC: 97 MMOL/L (ref 98–107)
CO2 SERPL-SCNC: 23 MMOL/L (ref 21–32)
COLOR UR: YELLOW
CREAT SERPL-MCNC: 1.2 MG/DL (ref 0.6–1)
ERYTHROCYTE [DISTWIDTH] IN BLOOD BY AUTOMATED COUNT: 17.3 % (ref 10.5–14.5)
GLUCOSE SERPL-MCNC: 176 MG/DL (ref 74–106)
GRANULOCYTES NFR BLD MANUAL: 77 % (ref 36–66)
HCO3 BLD-SCNC: 20.3 MMOL/L (ref 22–26)
HCT VFR BLD CALC: 29.8 % (ref 37–47)
HGB BLD-MCNC: 9.6 GM/DL (ref 12–15)
KETONES UR STRIP-MCNC: NEGATIVE MG/DL
LYMPHOCYTES NFR BLD AUTO: 7 % (ref 24–44)
MCH RBC QN AUTO: 26.6 PG (ref 26–34)
MCHC RBC AUTO-ENTMCNC: 32.3 G/DL (ref 28–37)
MCV RBC: 82.3 FL (ref 80–100)
MONOCYTES NFR BLD: 7 % (ref 1–8)
NEUTROPHILS # BLD: 18.1 THOU/UL (ref 1.4–8.2)
NEUTS BAND NFR BLD: 9 % (ref 0–8)
PCO2 BLD: 31.9 MMHG (ref 35–45)
PLATELET # BLD: 305 THOU/UL (ref 150–400)
PO2 BLD: 60.3 MMHG (ref 80–100)
POTASSIUM SERPL-SCNC: 3.5 MMOL/L (ref 3.5–5.1)
RBC # BLD AUTO: 3.62 MIL/UL (ref 4.2–5)
RBC # UR STRIP: (no result) /UL
RBC #/AREA URNS HPF: (no result) /HPF (ref 0–2)
SODIUM SERPL-SCNC: 129 MMOL/L (ref 136–145)
SP GR UR STRIP: 1.01 (ref 1–1.03)
SQUAMOUS: (no result) /LPF (ref 0–3)
URINE CLARITY: (no result)
URINE GLUCOSE-RANDOM*: NEGATIVE
URINE LEUKOCYTES-REFLEX: (no result)
URINE NITRITE-REFLEX: NEGATIVE
URINE PROTEIN (DIPSTICK): (no result)
URINE WBC-REFLEX: (no result) /HPF (ref 0–5)
UROBILINOGEN UR STRIP-ACNC: 0.2 E.U./DL (ref 0.2–1)
WBC # BLD AUTO: 21.1 THOU/UL (ref 4–11)

## 2019-02-23 PROCEDURE — 10879: CPT

## 2019-02-24 VITALS — SYSTOLIC BLOOD PRESSURE: 133 MMHG | DIASTOLIC BLOOD PRESSURE: 47 MMHG

## 2019-02-24 VITALS — DIASTOLIC BLOOD PRESSURE: 55 MMHG | SYSTOLIC BLOOD PRESSURE: 133 MMHG

## 2019-02-24 VITALS — SYSTOLIC BLOOD PRESSURE: 147 MMHG | DIASTOLIC BLOOD PRESSURE: 51 MMHG

## 2019-02-24 VITALS — SYSTOLIC BLOOD PRESSURE: 147 MMHG | DIASTOLIC BLOOD PRESSURE: 54 MMHG

## 2019-02-24 VITALS — SYSTOLIC BLOOD PRESSURE: 136 MMHG | DIASTOLIC BLOOD PRESSURE: 58 MMHG

## 2019-02-24 VITALS — DIASTOLIC BLOOD PRESSURE: 49 MMHG | SYSTOLIC BLOOD PRESSURE: 131 MMHG

## 2019-02-24 NOTE — NUR
Assumed came up from ER approx 0030. Alert and oriented to self and place. Pt
able to provide limited admission hx. [facility]
called and obtained the rest of the admission information needed. IVF and
antibiotics administered. Stool sample sent to lab to test for c-diff. Bruise
noted on pt forehead, likely from a recent fall. Abdomen round and distended.
Fall precautions in place. Will continue to monitor and assist with needs.

## 2019-02-25 VITALS — DIASTOLIC BLOOD PRESSURE: 57 MMHG | SYSTOLIC BLOOD PRESSURE: 142 MMHG

## 2019-02-25 VITALS — DIASTOLIC BLOOD PRESSURE: 66 MMHG | SYSTOLIC BLOOD PRESSURE: 151 MMHG

## 2019-02-25 VITALS — SYSTOLIC BLOOD PRESSURE: 134 MMHG | DIASTOLIC BLOOD PRESSURE: 62 MMHG

## 2019-02-25 VITALS — SYSTOLIC BLOOD PRESSURE: 145 MMHG | DIASTOLIC BLOOD PRESSURE: 66 MMHG

## 2019-02-25 VITALS — SYSTOLIC BLOOD PRESSURE: 122 MMHG | DIASTOLIC BLOOD PRESSURE: 61 MMHG

## 2019-02-25 LAB
ANION GAP SERPL CALC-SCNC: 13 MMOL/L (ref 7–16)
BASOPHILS NFR BLD AUTO: 0.4 % (ref 0–2)
BUN SERPL-MCNC: 26 MG/DL (ref 7–18)
CALCIUM SERPL-MCNC: 8.6 MG/DL (ref 8.5–10.1)
CHLORIDE SERPL-SCNC: 105 MMOL/L (ref 98–107)
CO2 SERPL-SCNC: 20 MMOL/L (ref 21–32)
CREAT SERPL-MCNC: 1.1 MG/DL (ref 0.6–1)
EOSINOPHIL NFR BLD: 0.5 % (ref 0–3)
ERYTHROCYTE [DISTWIDTH] IN BLOOD BY AUTOMATED COUNT: 17.7 % (ref 10.5–14.5)
GLUCOSE SERPL-MCNC: 77 MG/DL (ref 74–106)
GRANULOCYTES NFR BLD MANUAL: 87.7 % (ref 36–66)
HCT VFR BLD CALC: 23.9 % (ref 37–47)
HGB BLD-MCNC: 8 GM/DL (ref 12–15)
LYMPHOCYTES NFR BLD AUTO: 6.2 % (ref 24–44)
MCH RBC QN AUTO: 27.8 PG (ref 26–34)
MCHC RBC AUTO-ENTMCNC: 33.5 G/DL (ref 28–37)
MCV RBC: 82.8 FL (ref 80–100)
MONOCYTES NFR BLD: 5.2 % (ref 1–8)
NEUTROPHILS # BLD: 14.5 THOU/UL (ref 1.4–8.2)
PLATELET # BLD: 247 THOU/UL (ref 150–400)
POTASSIUM SERPL-SCNC: 3.1 MMOL/L (ref 3.5–5.1)
RBC # BLD AUTO: 2.88 MIL/UL (ref 4.2–5)
SODIUM SERPL-SCNC: 138 MMOL/L (ref 136–145)
WBC # BLD AUTO: 16.5 THOU/UL (ref 4–11)

## 2019-02-25 NOTE — NUR
ASSUMED PATIENT CARE AT 0700. A/O X4. GENERLIZED WEAKNESS. MAX ASSISTED TO
BSC. PATIENT HAD TOW LOOSE STOOL. C/O ABD PAIN. VSS. SLOWLY TOWARDS POC GOALS.

## 2019-02-25 NOTE — NUR
PATIENT IS ALERT AND ORIENTED. PATIENT IS MAX 2 ASSIST TO BSC. PATIENT HAD ONE
BM THIS SHIFT. CDIFF WAS NEG. PATIENT HAS REYNOLDS IN PLACE FOR I&O. PATIENT
DENIES PAIN. PATIENT IS ONE ROOM AIR. PATIENT CAN TURN SELF BUT OCCATIONAL
REQUIRES ASSISTANCE. PATIENT HAS BEEN SLEEPING MOST OF THE SHIFT NOT WANTING
TO BE DISTRUBED. PATIENT IS NSR ON TELE. PATIENT IS RESTING COMFORTABLEY IN
BED. WCM. PATIENT IS PROGRESSING TO GOALS

## 2019-02-25 NOTE — NUR
ASSESSMENT: CM REVIEWED CHART AND MET WITH PATIENT AT THE BEDSIDE. PT WAS
ADMITTED FOR DIARRHEA/ABDOMINAL PAIN. STOOL CULTURE IS PENDING. PT IS A LTC
RESIDENT FROM [facility] (3RD FLOOR). PT REPORTS SHE HAS BEEN
USING A WHEELCHAIR AND WALKER AT TIMES. CM REACHED OUT TO [facility] [phone] AND ASKED FOR ADMISSIONS. CM SPOKE WITH ATIYA MASSEY WHO
REQUEST CLINICAL BE FAXED TO THEIR [phone]. CM FAXED CLINICAL.
PATIENT PLANS TO RETURN TO [facility] ONCE MEDICALLY STABLE. PATIENT MAY NEED A
COLONOSCOPY. CM WILL CONTINUE TO FOLLOW TO ASSIST AS NEEDED.

## 2019-02-26 VITALS — DIASTOLIC BLOOD PRESSURE: 65 MMHG | SYSTOLIC BLOOD PRESSURE: 131 MMHG

## 2019-02-26 VITALS — DIASTOLIC BLOOD PRESSURE: 69 MMHG | SYSTOLIC BLOOD PRESSURE: 140 MMHG

## 2019-02-26 VITALS — SYSTOLIC BLOOD PRESSURE: 153 MMHG | DIASTOLIC BLOOD PRESSURE: 74 MMHG

## 2019-02-26 VITALS — SYSTOLIC BLOOD PRESSURE: 168 MMHG | DIASTOLIC BLOOD PRESSURE: 59 MMHG

## 2019-02-26 LAB
ANION GAP SERPL CALC-SCNC: 13 MMOL/L (ref 7–16)
BUN SERPL-MCNC: 20 MG/DL (ref 7–18)
CALCIUM SERPL-MCNC: 9 MG/DL (ref 8.5–10.1)
CHLORIDE SERPL-SCNC: 106 MMOL/L (ref 98–107)
CO2 SERPL-SCNC: 20 MMOL/L (ref 21–32)
CREAT SERPL-MCNC: 1 MG/DL (ref 0.6–1)
ERYTHROCYTE [DISTWIDTH] IN BLOOD BY AUTOMATED COUNT: 17.8 % (ref 10.5–14.5)
GLUCOSE SERPL-MCNC: 180 MG/DL (ref 74–106)
HCT VFR BLD CALC: 26 % (ref 37–47)
HGB BLD-MCNC: 8.6 GM/DL (ref 12–15)
MCH RBC QN AUTO: 27.3 PG (ref 26–34)
MCHC RBC AUTO-ENTMCNC: 33 G/DL (ref 28–37)
MCV RBC: 82.8 FL (ref 80–100)
PLATELET # BLD: 294 THOU/UL (ref 150–400)
POTASSIUM SERPL-SCNC: 3.8 MMOL/L (ref 3.5–5.1)
RBC # BLD AUTO: 3.15 MIL/UL (ref 4.2–5)
SODIUM SERPL-SCNC: 139 MMOL/L (ref 136–145)
WBC # BLD AUTO: 15.8 THOU/UL (ref 4–11)

## 2019-02-26 NOTE — NUR
on-going assessment: CM REVIEWED CHART. PATIENT IS SLOWLY PROGRESSING TOWARDS
DISCHARGE GOALS. PT REMAINS ON CLEARS AND ANBX. PT WILL RETURN TO [facility] ONCE MEDICALLY STABLE.

## 2019-02-26 NOTE — NUR
ASSUMED PATIENT CARE AT 0700. A/0 X4. ANXOIUS. SOB WITH EXERTION. C/O  ABD
PAIN. VOID AFTER REYNOLDS DC'D. PATIENT REFUSED Q2H TURN DUE TO NO ABLE TO
BREATHING WELL. ONE LOOSE STOOL. SLOWLY TOWARDS POC GOALS.

## 2019-02-26 NOTE — NUR
PATIENTS CARES WERE ASSUMED AT SHIFT CHANGE. PATIENT WAS ASSESSED AND MEDS
WERE PASSED. PATIENT REMAINS ON BEDREST THIS SHIFT DUE TO HER INABILITY TO
HOLD HER OWN WEIGHT, THE BED PAN WAS USED. HOURLY ROUNDING WAS DONE. THE BED
IS IN A LOW AND LOCKED POSITION

## 2019-02-27 VITALS — SYSTOLIC BLOOD PRESSURE: 156 MMHG | DIASTOLIC BLOOD PRESSURE: 65 MMHG

## 2019-02-27 VITALS — DIASTOLIC BLOOD PRESSURE: 72 MMHG | SYSTOLIC BLOOD PRESSURE: 158 MMHG

## 2019-02-27 VITALS — SYSTOLIC BLOOD PRESSURE: 179 MMHG | DIASTOLIC BLOOD PRESSURE: 62 MMHG

## 2019-02-27 VITALS — DIASTOLIC BLOOD PRESSURE: 99 MMHG | SYSTOLIC BLOOD PRESSURE: 159 MMHG

## 2019-02-27 LAB
ANION GAP SERPL CALC-SCNC: 10 MMOL/L (ref 7–16)
BUN SERPL-MCNC: 10 MG/DL (ref 7–18)
CALCIUM SERPL-MCNC: 8.9 MG/DL (ref 8.5–10.1)
CHLORIDE SERPL-SCNC: 107 MMOL/L (ref 98–107)
CO2 SERPL-SCNC: 23 MMOL/L (ref 21–32)
CREAT SERPL-MCNC: 0.8 MG/DL (ref 0.6–1)
ERYTHROCYTE [DISTWIDTH] IN BLOOD BY AUTOMATED COUNT: 17.7 % (ref 10.5–14.5)
GLUCOSE SERPL-MCNC: 162 MG/DL (ref 74–106)
HCT VFR BLD CALC: 27.4 % (ref 37–47)
HGB BLD-MCNC: 9 GM/DL (ref 12–15)
MCH RBC QN AUTO: 27.2 PG (ref 26–34)
MCHC RBC AUTO-ENTMCNC: 33 G/DL (ref 28–37)
MCV RBC: 82.5 FL (ref 80–100)
PLATELET # BLD: 325 THOU/UL (ref 150–400)
POTASSIUM SERPL-SCNC: 3.5 MMOL/L (ref 3.5–5.1)
RBC # BLD AUTO: 3.32 MIL/UL (ref 4.2–5)
SODIUM SERPL-SCNC: 140 MMOL/L (ref 136–145)
WBC # BLD AUTO: 11.6 THOU/UL (ref 4–11)

## 2019-02-27 NOTE — NUR
Assumed are of pt at 0700.  pt aox4 but showing signs of paranoia this
morning, making vague statements suggesting people are playing games on her,
but unable to describe any specific persons or events that happened overnight
that disturbed her. at this time breathing moderately labored, coarse/wheezy
to auscultation, appearing very anxious.  physician notified - xanax
restarted, chest xray ordered showing small left effusion.  one time lasix
given with large amt of output.  patient now much more at ease.  up
to chair.  breathing improved.  largely incontinent w/ female ext cath in
place.  calls out appropriately.  sinus on telemetry.  pt progressing toward
poc goals.

## 2019-02-27 NOTE — NUR
DAY RN STATED THAT PT ASKED IF THIS RN WAS STILL HERE.  APPROACHED PT STATING
"DID YOU WANT TO SEE ME BEFORE I GO HOME."  PT WAS ON THE PHONE AND STATED
"ASHLEIGH, GET HERE NOW!" AND THEN HUNG UP.  THE PATIENT WAS ASKED WHAT WAS
WRONG.  "YOU DON'T KNOW?  YOU THREATENED ME LAST NIGHT."  PT DID NOT PROVIDE
FURTHER EXPLANATION.  "GET OUT OF HERE.  IT'S MY WORD AGAINST YOURS!"  THIS RN
DID PROMPTLY LEAVE THE ROOM AND INFORM THE DAY RN.  AT THIS TIME, THE MORNING
EVIRONMENTAL SERVICES STAFF MEMBER REPORTED WHEN IN THE PATIENT ROOM A
"TRAITOR!" THEN WAS TOLD TO LEAVE THE ROOM.

## 2019-02-27 NOTE — NUR
PT MAKING SLOW PROGRESS TOWARDS GOALS.  ON O2 AT 2L OVERNIGHT.  LUNGS WHZ BL
UPPER LOBES AND COARSE BL BASES.  REPORTING SOA AFTER MULTIPLE COUGHS.
ENOURAGED TO TAKE SLOW DEEP BREATH THROUGH THE NOSE WHEN THAT OCCURS.  THIS
AM, APPROACHED PT TO GIVE SYNTHROID AND PT SUDDENLY STATED "GO AWAY."  WHEN
ASKED IF SHE RECOGNIZED THIS WRITER SHE REPLIED WITH "YEA, I KNOW WHO YOU
ARE."  PT REFUSED THE SYNTHROID, "I'M NOT TAKING THAT NOW."  WHEN ASKED IF
ANYTHING BAD HAD HAPPENED PT REPLIED WITH "NO."  WHEN ASKED IF SHE WAS UPSET
DUE TO FREQUENT INTERRUPTIONS IN HER SLEEP SHE REPLIED, "YES."  WILL ASK DAY
RN TO GIVE SYNTHROID AND NOT WAKE PT DURING SHIFT REPORT.

## 2019-02-28 VITALS — DIASTOLIC BLOOD PRESSURE: 79 MMHG | SYSTOLIC BLOOD PRESSURE: 154 MMHG

## 2019-02-28 VITALS — DIASTOLIC BLOOD PRESSURE: 70 MMHG | SYSTOLIC BLOOD PRESSURE: 148 MMHG

## 2019-02-28 VITALS — SYSTOLIC BLOOD PRESSURE: 151 MMHG | DIASTOLIC BLOOD PRESSURE: 64 MMHG

## 2019-02-28 VITALS — DIASTOLIC BLOOD PRESSURE: 67 MMHG | SYSTOLIC BLOOD PRESSURE: 170 MMHG

## 2019-02-28 LAB
ANION GAP SERPL CALC-SCNC: 13 MMOL/L (ref 7–16)
BUN SERPL-MCNC: 7 MG/DL (ref 7–18)
CALCIUM SERPL-MCNC: 8.9 MG/DL (ref 8.5–10.1)
CHLORIDE SERPL-SCNC: 104 MMOL/L (ref 98–107)
CO2 SERPL-SCNC: 25 MMOL/L (ref 21–32)
CREAT SERPL-MCNC: 0.7 MG/DL (ref 0.6–1)
ERYTHROCYTE [DISTWIDTH] IN BLOOD BY AUTOMATED COUNT: 17.7 % (ref 10.5–14.5)
GLUCOSE SERPL-MCNC: 121 MG/DL (ref 74–106)
HCT VFR BLD CALC: 29.9 % (ref 37–47)
HGB BLD-MCNC: 9.8 GM/DL (ref 12–15)
MCH RBC QN AUTO: 27 PG (ref 26–34)
MCHC RBC AUTO-ENTMCNC: 32.7 G/DL (ref 28–37)
MCV RBC: 82.8 FL (ref 80–100)
PLATELET # BLD: 366 THOU/UL (ref 150–400)
POTASSIUM SERPL-SCNC: 3 MMOL/L (ref 3.5–5.1)
RBC # BLD AUTO: 3.62 MIL/UL (ref 4.2–5)
SODIUM SERPL-SCNC: 142 MMOL/L (ref 136–145)
WBC # BLD AUTO: 11.8 THOU/UL (ref 4–11)

## 2019-02-28 NOTE — NUR
Assumed care of Pt at 0700.  Pt AOX4 in no acute distress.  Breathing improved
today, remains on supplemental oxygen.  pain well controlled with current
regimen.  hypertensive this AM - physician notified - med regimen adjusted -
bp improved.  up w/ 1 assist to BSC - bm today.  up to chair for most of day.
sinus on telemetry.  no other remarkable changes to report.  anticipate d/c
1-2 days back to NH.  pt progressing toward poc goals.

## 2019-02-28 NOTE — NUR
ON-GOING ASSESSMENT: CM REVIEWED CHART. PT IS PROGRESSING TOWARDS DISCHARGE
GOALS. PT IS CONTINUING HER ANBX AND SLOLY IMPROVING. CM NOTIFIED [facility] THAT
PATIENT MAY BE READY FOR DISCHARGE IN THE NEXT 1-2 DAYS. CM WILL CONTINUE TO
FOLLOW TO ASSIST AS NEEDED.

## 2019-02-28 NOTE — NUR
ASSUMED CARE OF PT AT 1900. A&Ox4, COOPERATIVE. A FEW ODD STATEMENTS ABOUT
HOSPITAL STAFF BUT RESPONDING WELL TO INTERACTIONS W/ CURRENT TECH AND RN.
VS STABLE. SR ON TELE. NO RESP DISTRESS, COMPLIANT W/ BREATHING TXs. REQUESTED
BEDPAN 1X BUT UNABLE TO VOID. CURRENTLY RESTING. PROGRESSING TOWARDS POC
GOALS.

## 2019-03-01 VITALS — DIASTOLIC BLOOD PRESSURE: 68 MMHG | SYSTOLIC BLOOD PRESSURE: 138 MMHG

## 2019-03-01 VITALS — SYSTOLIC BLOOD PRESSURE: 173 MMHG | DIASTOLIC BLOOD PRESSURE: 59 MMHG

## 2019-03-01 VITALS — SYSTOLIC BLOOD PRESSURE: 138 MMHG | DIASTOLIC BLOOD PRESSURE: 68 MMHG

## 2019-03-01 LAB
ANION GAP SERPL CALC-SCNC: 10 MMOL/L (ref 7–16)
BUN SERPL-MCNC: 8 MG/DL (ref 7–18)
CALCIUM SERPL-MCNC: 8.9 MG/DL (ref 8.5–10.1)
CHLORIDE SERPL-SCNC: 103 MMOL/L (ref 98–107)
CO2 SERPL-SCNC: 26 MMOL/L (ref 21–32)
CREAT SERPL-MCNC: 0.7 MG/DL (ref 0.6–1)
ERYTHROCYTE [DISTWIDTH] IN BLOOD BY AUTOMATED COUNT: 17.5 % (ref 10.5–14.5)
FERRITIN SERPL-MCNC: 147 NG/ML (ref 8–252)
FOLATE SERPL-MCNC: 14.8 NG/ML (ref 8.6–58.9)
GLUCOSE SERPL-MCNC: 130 MG/DL (ref 74–106)
HCT VFR BLD CALC: 30.6 % (ref 37–47)
HGB BLD-MCNC: 10 GM/DL (ref 12–15)
IRON SERPL-MCNC: 50 UG/DL (ref 50–170)
MCH RBC QN AUTO: 26.8 PG (ref 26–34)
MCHC RBC AUTO-ENTMCNC: 32.7 G/DL (ref 28–37)
MCV RBC: 82 FL (ref 80–100)
PLATELET # BLD: 392 THOU/UL (ref 150–400)
POTASSIUM SERPL-SCNC: 3.5 MMOL/L (ref 3.5–5.1)
RBC # BLD AUTO: 3.73 MIL/UL (ref 4.2–5)
SAO2 % BLD FROM PO2: 28 % (ref 20–39)
SODIUM SERPL-SCNC: 139 MMOL/L (ref 136–145)
TIBC SERPL-MCNC: 176 UG/DL (ref 250–450)
TSH SERPL-ACNC: 2.98 UIU/ML (ref 0.36–3.74)
VIT B12 SERPL-MCNC: 1265 PG/ML (ref 193–986)
WBC # BLD AUTO: 11.6 THOU/UL (ref 4–11)

## 2019-03-01 NOTE — NUR
Assumed care of patient at 0700. Vitals have been stable. Patient is alert and
oriented x4, pleasant. Denies pain, SOB or nausea / vomiting. Patient feels
much improved since admission. Still weak, but weakness has also improved. Up
with SBA, GB and walker.  Sat in chair this afternoon. One loose BM today.  Is
ready to discharge home. Discharge orders received. Report called to Janusz
at [facility]. IV and telemetry discontinued. Belongings
gathered. Patient transported back to facility via wheelchair.

## 2019-03-01 NOTE — NUR
ON-GOING ASSESSMENT: CM REVIEWED CHART AND MET WITH PATIENT AT THE BEDSIDE. PT
HAS ORDERS TO DISCHARGE BACK TO [facility] TODAY. CM ORDERED
CHART COPY. CM SPOKE WITH BEDSIDE RN TO GIVE HER THE NUMBER FOR REPORT
[phone] AND ASK FOR THE THIRD FLOOR NURSE NICKY. CM FAXED D/C ORDERS
AND CONFIRMED RECEIPT. JOSE MIGUEL SPOKE WITH PATIENTS DAUGHTER ASHLEIGH WHO IS PRESENT
AND SHE IS UNABLE TO PROVIDE TRANSPORTATION FOR PATIENT TODAY. CM REACHED OUT
TO [facility] WHO REQUEST [facility] TRANSPORTATION. CM
CONTACTED [facility] AND THEY ARE PICKING PATIENT UP AT 2:00PM. CM
NOTIFIED BEDSIDE RN AND PATIENT. PT REPORTS NO FURTHER NEEDS FROM CM AT THIS
TIME.

## 2019-03-01 NOTE — NUR
ASSUMED PT CARE AROUND 1900. A&OX4, FORGETFUL. DENIES ANY PAIN. UP TO BSC. HAD
ONE BOWEL MOVEMENT THIS SHIFT. PT SLEPT MOST OF THE NIGHT. RESP EVEN AND
UNLABORED. VSS. NO MAJOR COMPLAINTS THIS SHIFT. PROGRESSING TOWARD POC GOALS.
WILL CONTINUE TO MONITOR FURTHER.

## 2019-03-28 LAB
ABSOLUTE BASOPHILS: 0.1 THOU/UL (ref 0–0.2)
ABSOLUTE EOSINOPHILS: 0.4 THOU/UL (ref 0–0.7)
ABSOLUTE MONOCYTES: 0.6 THOU/UL (ref 0–1.2)
ALBUMIN SERPL-MCNC: 3.4 G/DL (ref 3.4–5)
ALP SERPL-CCNC: 113 U/L (ref 46–116)
ALT SERPL-CCNC: 11 U/L (ref 30–65)
ANION GAP SERPL CALC-SCNC: 10 MMOL/L (ref 7–16)
APTT BLD: 38.7 SECONDS (ref 25–31.3)
AST SERPL-CCNC: 13 U/L (ref 15–37)
BASOPHILS NFR BLD AUTO: 2.1 %
BILIRUB SERPL-MCNC: 0.3 MG/DL
BUN SERPL-MCNC: 33 MG/DL (ref 7–18)
CALCIUM SERPL-MCNC: 9.8 MG/DL (ref 8.5–10.1)
CHLORIDE SERPL-SCNC: 103 MMOL/L (ref 98–107)
CO2 SERPL-SCNC: 27 MMOL/L (ref 21–32)
CREAT SERPL-MCNC: 1.3 MG/DL (ref 0.6–1.3)
EOSINOPHIL NFR BLD: 7.6 %
ESR (SEDRATE): 75 MM/HR (ref 0–30)
GLUCOSE SERPL-MCNC: 84 MG/DL (ref 70–99)
GRANULOCYTES NFR BLD MANUAL: 53.7 %
HCT VFR BLD CALC: 31.6 % (ref 37–47)
HGB BLD-MCNC: 10.4 GM/DL (ref 12–15)
INR PPP: 1
LYMPHOCYTES # BLD: 1.2 THOU/UL (ref 0.8–5.3)
LYMPHOCYTES NFR BLD AUTO: 24.5 %
MCH RBC QN AUTO: 28.1 PG (ref 26–34)
MCHC RBC AUTO-ENTMCNC: 32.8 G/DL (ref 28–37)
MCV RBC: 85.6 FL (ref 80–100)
MONOCYTES NFR BLD: 12.1 %
MPV: 8.4 FL. (ref 7.2–11.1)
NEUTROPHILS # BLD: 2.5 THOU/UL (ref 1.6–8.1)
NUCLEATED RBCS: 0 /100WBC
PLATELET COUNT*: 220 THOU/UL (ref 150–400)
POTASSIUM SERPL-SCNC: 4.2 MMOL/L (ref 3.5–5.1)
PROT SERPL-MCNC: 8.2 G/DL (ref 6.4–8.2)
PROTHROMBIN TIME: 10.6 SECONDS (ref 9.2–11.5)
RBC # BLD AUTO: 3.69 MIL/UL (ref 4.2–5)
RDW-CV: 18.4 % (ref 10.5–14.5)
SODIUM SERPL-SCNC: 140 MMOL/L (ref 136–145)
WBC # BLD AUTO: 4.7 THOU/UL (ref 4–11)

## 2019-03-28 NOTE — EKG
[place] [address]
Phone:  [phone]                     ELECTROCARDIOGRAM REPORT      
_______________________________________________________________________________
 
Name:       TYLOR COOPER           Room:                      PRE IN  
[facility].#:  [id number]      Account #:      [id number]  
Admission:               Attend Phys:    Roberto Weeks DO  
Discharge:               Date of Birth:  [birth year]  
         Report #: [id number]
    [id number]
_______________________________________________________________________________
THIS REPORT FOR:  //name//                      
 
                          [facility]
                                       
Test Date:    2019               Test Time:    09:40:56
Pat Name:     TYLOR BEYERN       Department:   
Patient ID:   [id number]            Room:          
Gender:       F                        Technician:   
:          [id number]               Requested By: Roberto Weeks
Order Number: [id number]    Reading MD:   Lars Herrera
                                 Measurements
Intervals                              Axis          
Rate:         61                       P:            85
VT:           200                      QRS:          81
QRSD:         92                       T:            22
QT:           482                                    
QTc:          486                                    
                           Interpretive Statements
Sinus rhythm
Borderline right axis deviation
Borderline prolonged QT interval
No previous ECG available for comparison
 
Electronically Signed On 3- 13:15:54 CDT by Lars Herrera
[email or web address]
 
 
 
 
 
 
 
 
 
 
 
 
 
 
 
 
 
 
  <ELECTRONICALLY SIGNED>
                                           By: Lars Herrera MD, [facility]      
  19     1315
D: 19 0940   _____________________________________
T: 19 0940   Lars Herrera MD, FACC        /EPI

## 2019-04-12 ENCOUNTER — HOSPITAL ENCOUNTER (INPATIENT)
Dept: HOSPITAL 96 - M.ORTHSURG | Age: 84
LOS: 4 days | Discharge: NURSING, REHAB OR LONG-TERM CARE | DRG: 469 | End: 2019-04-16
Attending: INTERNAL MEDICINE | Admitting: INTERNAL MEDICINE
Payer: MEDICARE

## 2019-04-12 VITALS — SYSTOLIC BLOOD PRESSURE: 166 MMHG | DIASTOLIC BLOOD PRESSURE: 48 MMHG

## 2019-04-12 VITALS — WEIGHT: 152 LBS | BODY MASS INDEX: 31.91 KG/M2 | HEIGHT: 57.99 IN

## 2019-04-12 VITALS — SYSTOLIC BLOOD PRESSURE: 165 MMHG | DIASTOLIC BLOOD PRESSURE: 52 MMHG

## 2019-04-12 DIAGNOSIS — D62: ICD-10-CM

## 2019-04-12 DIAGNOSIS — Z90.710: ICD-10-CM

## 2019-04-12 DIAGNOSIS — Z98.41: ICD-10-CM

## 2019-04-12 DIAGNOSIS — E43: ICD-10-CM

## 2019-04-12 DIAGNOSIS — F32.9: ICD-10-CM

## 2019-04-12 DIAGNOSIS — Z88.5: ICD-10-CM

## 2019-04-12 DIAGNOSIS — Z88.0: ICD-10-CM

## 2019-04-12 DIAGNOSIS — Z88.8: ICD-10-CM

## 2019-04-12 DIAGNOSIS — M16.11: Primary | ICD-10-CM

## 2019-04-12 DIAGNOSIS — Z90.49: ICD-10-CM

## 2019-04-12 DIAGNOSIS — Z88.2: ICD-10-CM

## 2019-04-12 DIAGNOSIS — G25.81: ICD-10-CM

## 2019-04-12 DIAGNOSIS — Z88.1: ICD-10-CM

## 2019-04-12 DIAGNOSIS — Z98.42: ICD-10-CM

## 2019-04-12 DIAGNOSIS — J44.9: ICD-10-CM

## 2019-04-12 DIAGNOSIS — Z88.6: ICD-10-CM

## 2019-04-12 DIAGNOSIS — E78.5: ICD-10-CM

## 2019-04-12 DIAGNOSIS — E11.42: ICD-10-CM

## 2019-04-12 DIAGNOSIS — E87.6: ICD-10-CM

## 2019-04-12 DIAGNOSIS — F41.9: ICD-10-CM

## 2019-04-12 DIAGNOSIS — M48.061: ICD-10-CM

## 2019-04-12 DIAGNOSIS — Z79.84: ICD-10-CM

## 2019-04-12 DIAGNOSIS — E03.9: ICD-10-CM

## 2019-04-12 DIAGNOSIS — I10: ICD-10-CM

## 2019-04-12 LAB
HCT VFR BLD CALC: 28.9 % (ref 37–47)
HGB BLD-MCNC: 9.2 GM/DL (ref 12–15)

## 2019-04-12 PROCEDURE — 0QP604Z REMOVAL OF INTERNAL FIXATION DEVICE FROM RIGHT UPPER FEMUR, OPEN APPROACH: ICD-10-PCS | Performed by: FAMILY MEDICINE

## 2019-04-12 PROCEDURE — 0SR90JA REPLACEMENT OF RIGHT HIP JOINT WITH SYNTHETIC SUBSTITUTE, UNCEMENTED, OPEN APPROACH: ICD-10-PCS | Performed by: FAMILY MEDICINE

## 2019-04-13 VITALS — SYSTOLIC BLOOD PRESSURE: 185 MMHG | DIASTOLIC BLOOD PRESSURE: 55 MMHG

## 2019-04-13 VITALS — SYSTOLIC BLOOD PRESSURE: 121 MMHG | DIASTOLIC BLOOD PRESSURE: 44 MMHG

## 2019-04-13 VITALS — SYSTOLIC BLOOD PRESSURE: 144 MMHG | DIASTOLIC BLOOD PRESSURE: 49 MMHG

## 2019-04-13 LAB
ABSOLUTE BASOPHILS: 0.1 THOU/UL (ref 0–0.2)
ABSOLUTE EOSINOPHILS: 0 THOU/UL (ref 0–0.7)
ABSOLUTE MONOCYTES: 1.6 THOU/UL (ref 0–1.2)
ALBUMIN SERPL-MCNC: 2.4 G/DL (ref 3.4–5)
ALP SERPL-CCNC: 94 U/L (ref 46–116)
ALT SERPL-CCNC: 28 U/L (ref 30–65)
ANION GAP SERPL CALC-SCNC: 7 MMOL/L (ref 7–16)
AST SERPL-CCNC: 30 U/L (ref 15–37)
BASOPHILS NFR BLD AUTO: 0.9 %
BILIRUB SERPL-MCNC: 0.4 MG/DL
BUN SERPL-MCNC: 37 MG/DL (ref 7–18)
CALCIUM SERPL-MCNC: 9 MG/DL (ref 8.5–10.1)
CHLORIDE SERPL-SCNC: 106 MMOL/L (ref 98–107)
CO2 SERPL-SCNC: 27 MMOL/L (ref 21–32)
CREAT SERPL-MCNC: 1.5 MG/DL (ref 0.6–1.3)
EOSINOPHIL NFR BLD: 0.1 %
GLUCOSE SERPL-MCNC: 155 MG/DL (ref 70–99)
GRANULOCYTES NFR BLD MANUAL: 72.3 %
HCT VFR BLD CALC: 22.8 % (ref 37–47)
HCT VFR BLD CALC: 24.1 % (ref 37–47)
HGB BLD-MCNC: 7.4 GM/DL (ref 12–15)
HGB BLD-MCNC: 7.9 GM/DL (ref 12–15)
LYMPHOCYTES # BLD: 1.4 THOU/UL (ref 0.8–5.3)
LYMPHOCYTES NFR BLD AUTO: 12.5 %
MCH RBC QN AUTO: 28 PG (ref 26–34)
MCHC RBC AUTO-ENTMCNC: 32.8 G/DL (ref 28–37)
MCV RBC: 85.6 FL (ref 80–100)
MONOCYTES NFR BLD: 14.2 %
MPV: 8.7 FL. (ref 7.2–11.1)
NEUTROPHILS # BLD: 7.9 THOU/UL (ref 1.6–8.1)
NUCLEATED RBCS: 0 /100WBC
PLATELET COUNT*: 199 THOU/UL (ref 150–400)
POTASSIUM SERPL-SCNC: 4.9 MMOL/L (ref 3.5–5.1)
PROT SERPL-MCNC: 6.5 G/DL (ref 6.4–8.2)
RBC # BLD AUTO: 2.82 MIL/UL (ref 4.2–5)
RDW-CV: 17.8 % (ref 10.5–14.5)
SODIUM SERPL-SCNC: 140 MMOL/L (ref 136–145)
WBC # BLD AUTO: 11 THOU/UL (ref 4–11)

## 2019-04-14 VITALS — SYSTOLIC BLOOD PRESSURE: 162 MMHG | DIASTOLIC BLOOD PRESSURE: 65 MMHG

## 2019-04-14 VITALS — SYSTOLIC BLOOD PRESSURE: 181 MMHG | DIASTOLIC BLOOD PRESSURE: 60 MMHG

## 2019-04-14 LAB
HCT VFR BLD CALC: 22.1 % (ref 37–47)
HGB BLD-MCNC: 7 GM/DL (ref 12–15)

## 2019-04-15 VITALS — SYSTOLIC BLOOD PRESSURE: 142 MMHG | DIASTOLIC BLOOD PRESSURE: 68 MMHG

## 2019-04-15 VITALS — DIASTOLIC BLOOD PRESSURE: 31 MMHG | SYSTOLIC BLOOD PRESSURE: 119 MMHG

## 2019-04-15 VITALS — DIASTOLIC BLOOD PRESSURE: 43 MMHG | SYSTOLIC BLOOD PRESSURE: 107 MMHG

## 2019-04-15 VITALS — SYSTOLIC BLOOD PRESSURE: 145 MMHG | DIASTOLIC BLOOD PRESSURE: 42 MMHG

## 2019-04-15 LAB
HCT VFR BLD CALC: 18.9 % (ref 37–47)
HCT VFR BLD CALC: 23.6 % (ref 37–47)
HGB BLD-MCNC: 6.3 GM/DL (ref 12–15)
HGB BLD-MCNC: 7.9 GM/DL (ref 12–15)

## 2019-04-15 PROCEDURE — 30233N1 TRANSFUSION OF NONAUTOLOGOUS RED BLOOD CELLS INTO PERIPHERAL VEIN, PERCUTANEOUS APPROACH: ICD-10-PCS

## 2019-04-16 VITALS — SYSTOLIC BLOOD PRESSURE: 128 MMHG | DIASTOLIC BLOOD PRESSURE: 39 MMHG

## 2019-04-16 VITALS — DIASTOLIC BLOOD PRESSURE: 39 MMHG | SYSTOLIC BLOOD PRESSURE: 129 MMHG

## 2019-04-16 LAB
HCT VFR BLD CALC: 23.3 % (ref 37–47)
HGB BLD-MCNC: 7.8 GM/DL (ref 12–15)

## 2019-10-11 ENCOUNTER — HOSPITAL ENCOUNTER (INPATIENT)
Dept: HOSPITAL 35 - ER | Age: 84
LOS: 4 days | Discharge: NURSING, REHAB OR LONG-TERM CARE | DRG: 682 | End: 2019-10-15
Attending: INTERNAL MEDICINE | Admitting: INTERNAL MEDICINE
Payer: COMMERCIAL

## 2019-10-11 VITALS — SYSTOLIC BLOOD PRESSURE: 144 MMHG | DIASTOLIC BLOOD PRESSURE: 41 MMHG

## 2019-10-11 VITALS
WEIGHT: 167 LBS | SYSTOLIC BLOOD PRESSURE: 96 MMHG | HEIGHT: 57.99 IN | DIASTOLIC BLOOD PRESSURE: 39 MMHG | BODY MASS INDEX: 35.05 KG/M2

## 2019-10-11 VITALS — DIASTOLIC BLOOD PRESSURE: 68 MMHG | SYSTOLIC BLOOD PRESSURE: 159 MMHG

## 2019-10-11 VITALS — DIASTOLIC BLOOD PRESSURE: 88 MMHG | SYSTOLIC BLOOD PRESSURE: 121 MMHG

## 2019-10-11 VITALS — DIASTOLIC BLOOD PRESSURE: 25 MMHG | SYSTOLIC BLOOD PRESSURE: 143 MMHG

## 2019-10-11 DIAGNOSIS — Z88.5: ICD-10-CM

## 2019-10-11 DIAGNOSIS — N30.00: ICD-10-CM

## 2019-10-11 DIAGNOSIS — Z79.51: ICD-10-CM

## 2019-10-11 DIAGNOSIS — Z79.82: ICD-10-CM

## 2019-10-11 DIAGNOSIS — Z88.2: ICD-10-CM

## 2019-10-11 DIAGNOSIS — E89.0: ICD-10-CM

## 2019-10-11 DIAGNOSIS — E87.8: ICD-10-CM

## 2019-10-11 DIAGNOSIS — J45.909: ICD-10-CM

## 2019-10-11 DIAGNOSIS — I10: ICD-10-CM

## 2019-10-11 DIAGNOSIS — Z79.899: ICD-10-CM

## 2019-10-11 DIAGNOSIS — Z90.49: ICD-10-CM

## 2019-10-11 DIAGNOSIS — Z88.1: ICD-10-CM

## 2019-10-11 DIAGNOSIS — G93.41: ICD-10-CM

## 2019-10-11 DIAGNOSIS — Z88.0: ICD-10-CM

## 2019-10-11 DIAGNOSIS — N17.9: Primary | ICD-10-CM

## 2019-10-11 DIAGNOSIS — J44.9: ICD-10-CM

## 2019-10-11 DIAGNOSIS — Z90.710: ICD-10-CM

## 2019-10-11 DIAGNOSIS — Z23: ICD-10-CM

## 2019-10-11 DIAGNOSIS — R33.9: ICD-10-CM

## 2019-10-11 DIAGNOSIS — E11.9: ICD-10-CM

## 2019-10-11 DIAGNOSIS — Z88.8: ICD-10-CM

## 2019-10-11 LAB
ALBUMIN SERPL-MCNC: 2.9 G/DL (ref 3.4–5)
ALT SERPL-CCNC: 28 U/L (ref 30–65)
ANION GAP SERPL CALC-SCNC: 12 MMOL/L (ref 7–16)
AST SERPL-CCNC: 120 U/L (ref 15–37)
BASOPHILS NFR BLD AUTO: 0.8 % (ref 0–2)
BILIRUB SERPL-MCNC: 0.4 MG/DL
BILIRUB UR-MCNC: NEGATIVE MG/DL
BUN SERPL-MCNC: 60 MG/DL (ref 7–18)
CALCIUM SERPL-MCNC: 9 MG/DL (ref 8.5–10.1)
CHLORIDE SERPL-SCNC: 102 MMOL/L (ref 98–107)
CO2 SERPL-SCNC: 19 MMOL/L (ref 21–32)
COLOR UR: YELLOW
CREAT SERPL-MCNC: 2.1 MG/DL (ref 0.6–1)
EOSINOPHIL NFR BLD: 5.4 % (ref 0–3)
ERYTHROCYTE [DISTWIDTH] IN BLOOD BY AUTOMATED COUNT: 17 % (ref 10.5–14.5)
GLUCOSE SERPL-MCNC: 117 MG/DL (ref 74–106)
GRANULOCYTES NFR BLD MANUAL: 60.5 % (ref 36–66)
HCT VFR BLD CALC: 31.2 % (ref 37–47)
HGB BLD-MCNC: 10.1 GM/DL (ref 12–15)
INR PPP: 1
KETONES UR STRIP-MCNC: NEGATIVE MG/DL
LYMPHOCYTES NFR BLD AUTO: 23.2 % (ref 24–44)
MAGNESIUM SERPL-MCNC: 1.7 MG/DL (ref 1.8–2.4)
MCH RBC QN AUTO: 27.8 PG (ref 26–34)
MCHC RBC AUTO-ENTMCNC: 32.5 G/DL (ref 28–37)
MCV RBC: 85.7 FL (ref 80–100)
MONOCYTES NFR BLD: 10.1 % (ref 1–8)
NEUTROPHILS # BLD: 3.5 THOU/UL (ref 1.4–8.2)
PLATELET # BLD: 256 THOU/UL (ref 150–400)
POTASSIUM SERPL-SCNC: 5.9 MMOL/L (ref 3.5–5.1)
PROT SERPL-MCNC: 7.5 G/DL (ref 6.4–8.2)
PROTHROMBIN TIME: 10.6 SECONDS (ref 9.3–11.4)
RBC # BLD AUTO: 3.63 MIL/UL (ref 4.2–5)
RBC # UR STRIP: (no result) /UL
RBC #/AREA URNS HPF: (no result) /HPF (ref 0–2)
SODIUM SERPL-SCNC: 133 MMOL/L (ref 136–145)
SP GR UR STRIP: <= 1.005 (ref 1–1.03)
SQUAMOUS: (no result) /LPF (ref 0–3)
TROPONIN I SERPL-MCNC: <0.06 NG/ML (ref ?–0.06)
URINE CLARITY: CLEAR
URINE GLUCOSE-RANDOM*: NEGATIVE
URINE LEUKOCYTES-REFLEX: (no result)
URINE NITRITE-REFLEX: POSITIVE
URINE PROTEIN (DIPSTICK): (no result)
UROBILINOGEN UR STRIP-ACNC: 0.2 E.U./DL (ref 0.2–1)
WBC # BLD AUTO: 5.7 THOU/UL (ref 4–11)

## 2019-10-11 PROCEDURE — 10879: CPT

## 2019-10-11 NOTE — NUR
PT ADMITTED FROM ER AND LIVES AT [facility]. SHE WAS DX WITH
UTI YESTERDAY AND BECAME DROWSY THIS MORN AND FELL AT THE FACILTY..WHEN EMS
ARRIVED HER SUGAR WAS 46..RECEIVED D50 EN ROUTE...FALL PREC IN PLACE...

## 2019-10-12 VITALS — DIASTOLIC BLOOD PRESSURE: 33 MMHG | SYSTOLIC BLOOD PRESSURE: 147 MMHG

## 2019-10-12 VITALS — DIASTOLIC BLOOD PRESSURE: 38 MMHG | SYSTOLIC BLOOD PRESSURE: 146 MMHG

## 2019-10-12 VITALS — SYSTOLIC BLOOD PRESSURE: 143 MMHG | DIASTOLIC BLOOD PRESSURE: 100 MMHG

## 2019-10-12 VITALS — SYSTOLIC BLOOD PRESSURE: 115 MMHG | DIASTOLIC BLOOD PRESSURE: 51 MMHG

## 2019-10-12 VITALS — SYSTOLIC BLOOD PRESSURE: 153 MMHG | DIASTOLIC BLOOD PRESSURE: 41 MMHG

## 2019-10-12 VITALS — SYSTOLIC BLOOD PRESSURE: 169 MMHG | DIASTOLIC BLOOD PRESSURE: 56 MMHG

## 2019-10-12 VITALS — SYSTOLIC BLOOD PRESSURE: 161 MMHG | DIASTOLIC BLOOD PRESSURE: 30 MMHG

## 2019-10-12 LAB
ANION GAP SERPL CALC-SCNC: 11 MMOL/L (ref 7–16)
BUN SERPL-MCNC: 43 MG/DL (ref 7–18)
CALCIUM SERPL-MCNC: 8.5 MG/DL (ref 8.5–10.1)
CHLORIDE SERPL-SCNC: 110 MMOL/L (ref 98–107)
CO2 SERPL-SCNC: 20 MMOL/L (ref 21–32)
CREAT SERPL-MCNC: 1.4 MG/DL (ref 0.6–1)
ERYTHROCYTE [DISTWIDTH] IN BLOOD BY AUTOMATED COUNT: 16.6 % (ref 10.5–14.5)
GLUCOSE SERPL-MCNC: 129 MG/DL (ref 74–106)
HCT VFR BLD CALC: 26.5 % (ref 37–47)
HGB BLD-MCNC: 8.7 GM/DL (ref 12–15)
MCH RBC QN AUTO: 28 PG (ref 26–34)
MCHC RBC AUTO-ENTMCNC: 32.8 G/DL (ref 28–37)
MCV RBC: 85.4 FL (ref 80–100)
PLATELET # BLD: 232 THOU/UL (ref 150–400)
POTASSIUM SERPL-SCNC: 3.9 MMOL/L (ref 3.5–5.1)
RBC # BLD AUTO: 3.1 MIL/UL (ref 4.2–5)
SODIUM SERPL-SCNC: 141 MMOL/L (ref 136–145)
WBC # BLD AUTO: 5 THOU/UL (ref 4–11)

## 2019-10-12 NOTE — NUR
ASSUMED CARE AROUND 0715. AXOX3. REYNOLDS INTACT DRAINING LIGHT YELLOW URINE.
HOME MEDS RESUMED PER . NO S/S ACUTE DISTRESS NOTED OR REPORTED AT
THIS TIME. WILL CONT TO MONITOR FOR ANY CHANGES IN CONDITION.

## 2019-10-12 NOTE — NUR
Patient making slow progress towards outcome goals. High fall risks. Fall
precautions in place. IVfluids infusing. Orozco catherter placed for urinary
retention, large clear yellow output. Oriented to person but very forgetful.
Was able to pull IV out and denies she did it, fidgety, was able to unhook
orozco from stat lock. Has removed monitor patches stating she thought they
were batteries.

## 2019-10-12 NOTE — EKG
[address] [place]  [address]
Phone:  [phone]                    ELECTROCARDIOGRAM REPORT      
_______________________________________________________________________________
 
Name:       TYLOR COOPER          Room #:         351-P       ADM IN  
M.R.#:      [id number]     Account #:      [id number]  
Admission:  10/11/19    Attend Phys:    Lucy Vargas MD   
Discharge:              Date of Birth:  [birth year]  
                                                          Report #: [id number]
   [id number]
_______________________________________________________________________________
THIS REPORT FOR:   //name//                          
 
                         [facility]
                                       
Test Date:    2019-10-11               Test Time:    11:06:30
Pat Name:     TYLOR COOPER       Department:   
Patient ID:   [id number]            Room:         [facility]
Gender:       F                        Technician:   [id number]
:          1935               Requested By: Bunny Hernandez
Order Number: [id number] MD:   Olman Baptiste
                                 Measurements
Intervals                              Axis          
Rate:         59                       P:            100
AR:           208                      QRS:          75
QRSD:         103                      T:            69
QT:           482                                    
QTc:          478                                    
                           Interpretive Statements
Sinus rhythm
Nonspecific ST segment abnormalities
Compared to ECG 2019 12:37:50
No significant change
Electronically Signed On 10- 10:13:58 CDT by Olman Baptiste
[email or web address]
 
 
 
 
 
 
 
 
 
 
 
 
 
 
 
 
 
 
 
  <ELECTRONICALLY SIGNED>
   By: Olman Baptiste MD               
  10/12/19     1013
D: 10/11/19 1106                           _____________________________________
T: 10/11/19 1106                           MD ANGELA Stokes

## 2019-10-12 NOTE — H
[facility]
[address]
[place]   [address]                     HISTORY AND PHYSICAL          
_______________________________________________________________________________
 
Name:       TYLOR COOPER          Room #:         351-P       ADM IN  
M.R.#:      [id number]                       Account #:      [id number]  
Admission:  10/11/19    Attend Phys:    Lucy Vargas MD   
Discharge:              Date of Birth:  [birth year]  
                                                          Report #: [id number]
                                                                    [id number]   
_______________________________________________________________________________
THIS REPORT FOR:   //name//                          
 
CC: Lucy Vargas
 
DATE OF SERVICE:  10/11/2019
 
 
CHIEF COMPLAINT:  Drowsiness and abdominal pain.
 
HISTORY OF PRESENT ILLNESS:  The patient is an 84-year-old female from [facility], came to the Emergency Room with a 2-3 day history of
weakness, drowsiness and abdominal pain.  They also noted today she was
hypoglycemic as she had not been eating well and EMS noted a blood sugar of 46
and D10 was started en route.  She was drowsy, but responsive to questions.  She
said she felt some discomfort in the right side of her abdomen.  There was a
report of a urinalysis done at the facility and there was question of whether
some antibiotics have been ordered for possible urinary tract infection.
 
PAST MEDICAL HISTORY:  Hypertension, dyslipidemia, hypothyroidism, diabetes type
2, esophageal reflux, depression, anxiety, chronic pain, hernia repair, COPD
with several hospital stays for asthma exacerbation and pneumonia over the last
year.
 
PAST SURGICAL HISTORY:  None.
 
FAMILY HISTORY:  Noncontributory.
 
SOCIAL HISTORY:  She has been living at [facility] for several
years.  No chronic alcohol or tobacco use.
 
ALLERGIES:  CODEINE, FENTANYL, AVELOX, PENICILLIN, SULFA, FLEXERIL.
 
MEDICATIONS:  Aspirin, Tylenol, Lortab, Glucophage, Synthroid, Lexapro, Tums,
budesonide, Xanax, vitamin D, eye drops, lovastatin, Lamictal, albuterol,
gabapentin, losartan, amlodipine and metoprolol.
 
REVIEW OF SYSTEMS:  She is drowsy, but arousable.  Denies chest pain or
shortness of breath.  Complains of some right-sided abdominal discomfort. 
Otherwise, no headache, chest pain, productive cough, diarrhea, nausea,
vomiting, dysuria or syncope.
 
OBJECTIVE:
VITAL SIGNS:  Temperature 36.8, pulse 55, respirations 12, blood pressure
121/88, O2 sat 100% on 2 liters.
GENERAL:  She is drowsy, but arousable, in no distress.
HEAD AND NECK:  Unremarkable.
 
 
 
[facility]
[address] [place]   [address]                     HISTORY AND PHYSICAL          
_______________________________________________________________________________
 
Name:       TYLOR COOPER          Room #:         351-P       [facility] [place]  
.R.#:      [id number]                       Account #:      [id number]  
Admission:  10/11/19    Attend Phys:    Lucy Vargas MD   
Discharge:              Date of Birth:  [birth year]  
                                                          Report #: [id number]
                                                                    [id number]   
_______________________________________________________________________________
LUNGS:  Clear.
HEART:  Regular.
ABDOMEN:  Soft, protuberant.  Normal bowel sounds.  No rebound or guarding.  No
palpable masses.
EXTREMITIES:  No cyanosis, clubbing or edema.
NEUROLOGIC:  She opens her eyes.  Knows she is at [place].  Answers questions
appropriately, moves all extremities.
 
LABORATORY DATA:  Urinalysis had protein, blood, nitrite, leukocyte, red cells,
white cells, squamous cells, bacteria.  White count is 5, potassium was 5.9,
creatinine was 2.1, magnesium 1.7.  Troponin negative.  Albumin 2.9.
 
Chest x-ray is negative.  X-ray of the pelvis and hip showed no acute fracture. 
CT cervical spine showed no acute fracture.  CT head, nothing acute.
 
ASSESSMENT:
1.  Acute cystitis.
2.  Electrolyte disturbance.
3.  Acute renal failure.
4.  Hypertension.
5.  Diabetes type 2.
6.  Chronic obstructive pulmonary disease with asthma component.
 
PLAN:  She will be treated with empiric IV antibiotics pending cultures along
with IV fluids and repeat lab.  I have asked the nursing staff to check her for
urinary retention and a CT of the abdomen has been obtained.  We will hold home
medicines for now until she is more awake and if her renal function stabilizes. 
This could be a prerenal effect from diuretic and potassium supplementation
along with poor intake in the last day or two.  She has outside DNR at [facility] which will be continued here.
 
 
 
 
 
 
 
 
 
 
 
 
 
 
  <ELECTRONICALLY SIGNED>
   By: Lars Li MD           
  10/12/19     0749
D: 10/11/19 1720                           _____________________________________
T: 10/11/19 1849                           Lars Li MD             /nt

## 2019-10-13 VITALS — SYSTOLIC BLOOD PRESSURE: 152 MMHG | DIASTOLIC BLOOD PRESSURE: 47 MMHG

## 2019-10-13 VITALS — SYSTOLIC BLOOD PRESSURE: 153 MMHG | DIASTOLIC BLOOD PRESSURE: 47 MMHG

## 2019-10-13 VITALS — DIASTOLIC BLOOD PRESSURE: 66 MMHG | SYSTOLIC BLOOD PRESSURE: 174 MMHG

## 2019-10-13 VITALS — SYSTOLIC BLOOD PRESSURE: 161 MMHG | DIASTOLIC BLOOD PRESSURE: 51 MMHG

## 2019-10-13 VITALS — SYSTOLIC BLOOD PRESSURE: 188 MMHG | DIASTOLIC BLOOD PRESSURE: 63 MMHG

## 2019-10-13 LAB
ANION GAP SERPL CALC-SCNC: 10 MMOL/L (ref 7–16)
BUN SERPL-MCNC: 19 MG/DL (ref 7–18)
CALCIUM SERPL-MCNC: 8.6 MG/DL (ref 8.5–10.1)
CHLORIDE SERPL-SCNC: 108 MMOL/L (ref 98–107)
CO2 SERPL-SCNC: 22 MMOL/L (ref 21–32)
CREAT SERPL-MCNC: 1.1 MG/DL (ref 0.6–1)
ERYTHROCYTE [DISTWIDTH] IN BLOOD BY AUTOMATED COUNT: 16.4 % (ref 10.5–14.5)
GLUCOSE SERPL-MCNC: 118 MG/DL (ref 74–106)
HCT VFR BLD CALC: 28.7 % (ref 37–47)
HGB BLD-MCNC: 9.3 GM/DL (ref 12–15)
MCH RBC QN AUTO: 27.5 PG (ref 26–34)
MCHC RBC AUTO-ENTMCNC: 32.4 G/DL (ref 28–37)
MCV RBC: 84.8 FL (ref 80–100)
PLATELET # BLD: 288 THOU/UL (ref 150–400)
POTASSIUM SERPL-SCNC: 3.6 MMOL/L (ref 3.5–5.1)
RBC # BLD AUTO: 3.38 MIL/UL (ref 4.2–5)
SODIUM SERPL-SCNC: 140 MMOL/L (ref 136–145)
WBC # BLD AUTO: 5.3 THOU/UL (ref 4–11)

## 2019-10-13 NOTE — NUR
FOLLOWING POC WITH IVF AND ORAL MEDICATIONS.  PT HAS CHRONIC PAIN THAT ORAL
PAIN MEDICATION MANAGES WELL.  PT BREATH SOUNDS ARE DIMINISHED AND WITH
CRACKLES.  PT HAS A NON PRODUCTIVE COUGH THAT IS GETTING SOME FLEM UP WITH.
NEW IV ACCESS GAINED IN RIGHT OUTER FOREARM, PREVIOUS SITE IN LEFT FOREARM
BLEW.  REYNOLDS IN DUE TO RETENTION. VSS STABLE AND NO FEVER. HOURLY ROUNDING.

## 2019-10-13 NOTE — NUR
No changes of conditions. Pt remains stable in this shift. Up in chair x 6
hrs today, well selwyn activity. VSS. Report hand off to NOC nurse.

## 2019-10-14 VITALS — SYSTOLIC BLOOD PRESSURE: 193 MMHG | DIASTOLIC BLOOD PRESSURE: 64 MMHG

## 2019-10-14 VITALS — DIASTOLIC BLOOD PRESSURE: 68 MMHG | SYSTOLIC BLOOD PRESSURE: 188 MMHG

## 2019-10-14 VITALS — DIASTOLIC BLOOD PRESSURE: 61 MMHG | SYSTOLIC BLOOD PRESSURE: 176 MMHG

## 2019-10-14 VITALS — SYSTOLIC BLOOD PRESSURE: 173 MMHG | DIASTOLIC BLOOD PRESSURE: 60 MMHG

## 2019-10-14 VITALS — DIASTOLIC BLOOD PRESSURE: 73 MMHG | SYSTOLIC BLOOD PRESSURE: 170 MMHG

## 2019-10-14 VITALS — DIASTOLIC BLOOD PRESSURE: 58 MMHG | SYSTOLIC BLOOD PRESSURE: 179 MMHG

## 2019-10-14 LAB
ANION GAP SERPL CALC-SCNC: 9 MMOL/L (ref 7–16)
BUN SERPL-MCNC: 9 MG/DL (ref 7–18)
CALCIUM SERPL-MCNC: 8.9 MG/DL (ref 8.5–10.1)
CHLORIDE SERPL-SCNC: 106 MMOL/L (ref 98–107)
CO2 SERPL-SCNC: 23 MMOL/L (ref 21–32)
CREAT SERPL-MCNC: 0.7 MG/DL (ref 0.6–1)
GLUCOSE SERPL-MCNC: 115 MG/DL (ref 74–106)
POTASSIUM SERPL-SCNC: 3.5 MMOL/L (ref 3.5–5.1)
SODIUM SERPL-SCNC: 138 MMOL/L (ref 136–145)

## 2019-10-14 NOTE — NUR
CONTINUES TO HAVE A LOOSE CONGESTED COUGH. SHE IS COOPERATIVE, BUT CONFUSED AT
TIMES. COMPLAINS OF PAIN IN HER BACK. CAREPLAN REVIEWED.

## 2019-10-14 NOTE — NUR
ASSESSMENT: CM REVIEWED CHART AND MET WITH PATIENT AT THE BEDSIDE. PT WAS
ADMITTED WITH ACUTE ENCEOHALOPATHY/UTI AND IS A [facility]. PT REPORTS SHE USES A WALKER/WHEELCHAIR THERE FOR AMBULATION. PT
STATES SHE IS ON THE THIRD FLOOR THERE. PT REPORTS SHE ANTICIPATES DISCHARGING
BACK TO [facility] WHEN MEDICALLY STABLE. CM CONTACTED [facility] AND SPOKE WITH SANDRA MASSEY TO UPDATE HER AND SHE REQUEST WE FAX CLINICAL [phone]. CM FAXED
INFORMATION. CM ATTEMPTED TO REACH PATIENTS DAUGHTERS BUT NUMBERS LISTED ARE
NOT WORKING. CM WILL CONTINUE TO FOLLOW TO ASSIST AS NEEDED.

## 2019-10-15 VITALS — DIASTOLIC BLOOD PRESSURE: 52 MMHG | SYSTOLIC BLOOD PRESSURE: 190 MMHG

## 2019-10-15 VITALS — SYSTOLIC BLOOD PRESSURE: 153 MMHG | DIASTOLIC BLOOD PRESSURE: 77 MMHG

## 2019-10-15 VITALS — DIASTOLIC BLOOD PRESSURE: 67 MMHG | SYSTOLIC BLOOD PRESSURE: 179 MMHG

## 2019-10-15 VITALS — SYSTOLIC BLOOD PRESSURE: 178 MMHG | DIASTOLIC BLOOD PRESSURE: 49 MMHG

## 2019-10-15 NOTE — NUR
Patient to dc today, back to [facility],
[facility] will pick patient up between 4 and 430 pm
today, 3L oxygen.  DP faxed dc papers to [facility] and called and spoke with Pat at
[facility].  DP notified unit and sent papers for cc envelope.

## 2019-10-15 NOTE — NUR
SW reviewed chart and spoke with nursing and attending physician. Pt is
progressing towards goals for discharge. Discharge back to [facility] is anticipated
for today. Awaiting final discharge orders/summary at this time. SW met with
pt at bedside to discuss discharge plan. Pt is hoping to go home today.
Discharge planner to coordinate and notify family when discharge
orders/summary are available. SW is following to assist as needed with
discharge planning.

## 2019-10-15 NOTE — NUR
forgets to call for assist out of bed. she usually sets of bed alarm. more
clear with her thinking this shift. she has rested quietly tonight. careplan
reviwed.

## 2019-10-16 NOTE — D
[facility]
[address]
[place]   [address]                     DISCHARGE SUMMARY             
_______________________________________________________________________________
 
Name:       TYLOR COOPER          Room #:         351-P       [facility] [place]  
M.R.#:      [id number]                       Account #:      [id number]  
Admission:  10/11/19    Attend Phys:    Lucy Vargas MD   
Discharge:  10/15/19    Date of Birth:  [birth year]  
                                                          Report #: [id number]
                                                                    [id number]   
_______________________________________________________________________________
THIS REPORT FOR:   //name//                          
 
CC: Lucy Vargas
 
FINAL DIAGNOSES:
1.  Acute urinary tract infection.
2.  Acute urinary retention.
3.  Asthma.
4.  Diabetes type 2.
5.  Hypertension.
6.  Acute kidney injury, resolved.
 
HOSPITAL COURSE:  The patient was admitted with abdominal pain and was diagnosed
with urinary tract infection due to acute urinary retention, which also
precipitated an acute kidney injury.  A Albert catheter was placed and she had
about 600 mL of retained urine.  Her home medications were continued and she was
treated with empiric Rocephin and IV fluids.  By the third hospital day, her
creatinine had normalized and fluids were discontinued.  She complained of some
difficulty breathing despite normal saturations.  Chest x-ray showed atelectasis
and she was given a dose of Lasix and treated with incentive spirometer.  Albert
catheter was removed 24 hours prior to discharge and she was voiding.
 
PHYSICAL EXAMINATION:
GENERAL:  On the day of discharge, she was resting in bed, in no distress.
VITAL SIGNS:  Stable.
LUNGS:  Clear.
HEART:  Regular.
ABDOMEN:  Soft, normoactive bowel sounds.
EXTREMITIES:  No edema.
 
DISPOSITION:  She will be discharged to home with diabetic diet, activity as
tolerated, resume all home medications plus Ceftin b.i.d. for 7 more days. 
Follow up lab data in 2 days and in 1 week to monitor her creatinine.
 
 
 
 
 
 
 
 
 
 
 
  <ELECTRONICALLY SIGNED>
   By: Lars Li MD           
  10/16/19     1230
D: 10/15/19 1323                           _____________________________________
T: 10/15/19 1355                           Lars Li MD             /nt

## 2019-12-13 ENCOUNTER — HOSPITAL ENCOUNTER (OUTPATIENT)
Dept: HOSPITAL 35 - GI | Age: 84
Discharge: HOME | End: 2019-12-13
Attending: SPECIALIST
Payer: COMMERCIAL

## 2019-12-13 VITALS — WEIGHT: 164 LBS | BODY MASS INDEX: 34.43 KG/M2 | HEIGHT: 57.99 IN

## 2019-12-13 DIAGNOSIS — Z98.890: ICD-10-CM

## 2019-12-13 DIAGNOSIS — K51.40: Primary | ICD-10-CM

## 2019-12-13 DIAGNOSIS — I10: ICD-10-CM

## 2019-12-13 DIAGNOSIS — J45.909: ICD-10-CM

## 2019-12-13 DIAGNOSIS — G89.29: ICD-10-CM

## 2019-12-13 DIAGNOSIS — E78.5: ICD-10-CM

## 2019-12-13 DIAGNOSIS — E03.9: ICD-10-CM

## 2019-12-13 DIAGNOSIS — K52.9: ICD-10-CM

## 2019-12-13 DIAGNOSIS — E11.9: ICD-10-CM

## 2019-12-13 DIAGNOSIS — E78.00: ICD-10-CM

## 2019-12-13 DIAGNOSIS — Z79.899: ICD-10-CM

## 2019-12-13 DIAGNOSIS — Z90.49: ICD-10-CM

## 2019-12-13 DIAGNOSIS — F32.9: ICD-10-CM

## 2019-12-13 DIAGNOSIS — K62.89: ICD-10-CM

## 2019-12-13 DIAGNOSIS — Z86.73: ICD-10-CM

## 2019-12-13 DIAGNOSIS — K21.9: ICD-10-CM

## 2019-12-13 DIAGNOSIS — Z90.710: ICD-10-CM

## 2019-12-13 DIAGNOSIS — F41.9: ICD-10-CM

## 2019-12-13 DIAGNOSIS — K57.30: ICD-10-CM

## 2019-12-13 DIAGNOSIS — K29.50: ICD-10-CM

## 2019-12-13 DIAGNOSIS — Z88.2: ICD-10-CM

## 2019-12-13 PROCEDURE — 62900: CPT

## 2019-12-13 PROCEDURE — 62110: CPT

## 2019-12-16 NOTE — PATH
[facility]
[address]
[place]   [address]                     PATHOLOGY RPT PROCEDURE       
_______________________________________________________________________________
 
Name:       SIERRA COOPER          Room #:                     REG [facility] 
M..#:      [id number]     Account #:      [id number]  
Admission:  12/13/19    Date of Birth:  [birth year]  
Discharge:                                              Report #:    [id number]
                                                        Path Case #: [id number]
_______________________________________________________________________________
 
[facility] Accession Number: [id number]
.                                                                01
Material submitted:                                        .
PART A: small bowel - SMALL BOWEL BIOPSY DUE TO DIARRHEA R/O CELIAC
PART B: stomach - BIOPSY OF GASTRITIS
PART C: ileo-cecal valve - POLYP AT ILEOCECAL VALVE
PART D: cecum - RANDOM BIOPSY CECUM R/O COLITIS
PART E: colon - RANDOM BIOPSY AT RIGHT COLON AND TRANSVERSE COLON R/O
COLITIS. Modifiers: right, transverse
PART F: colon - RANDOM BIOPSY AT LEFT COLON. Modifiers: left
PART G: rectum - RANDOM BIOPSY AT RECTUM
.                                                                01
Clinical history:                                          .
Abnormal CT colon, diarrhea
.                                                                02
**********************************************************************
Diagnosis:
A. Small bowel mucosa, small bowel, endoscopic biopsy:
- No diagnostic abnormalities present.
- Negative for villous blunting or increase in intraepithelial
lymphocytes.
.
B. Gastric mucosa, gastritis, endoscopic biopsy:
- Mild chronic active gastritis.
- Negative for intestinal metaplasia or atrophy.
- Negative for Helicobacter pylori (properly controlled
immunohistochemical stain performed).
.
C. Polyp, at ileocecal valve, endoscopic biopsy:
- Inflammatory polyp associated with hyperplastic changes.
- Negative for dysplasia or malignancy.
.
D. Large intestinal mucosa, cecum, rule out colitis, endoscopic biopsy:
- Focal non-specific acute cryptitis along with increased apoptosis, see
comment.
- Negative for dysplasia or malignancy.
- Negative for microscopic colitis.
.
E. Large intestinal mucosa, right colon and transverse colon, endoscopic
biopsy:
- Focal non-specific acute cryptitis, see comment.
- Negative for dysplasia or malignancy.
- Negative for microscopic colitis.
- Few pigmented macrophages within lamina propria, compatible with
melanosis coli.
.
F. Large intestinal mucosa, left colon, endoscopic biopsy:
 
 
[facility]
[address]
[place]   [address]                     PATHOLOGY RPT PROCEDURE       
_______________________________________________________________________________
 
Name:       SIERRA COOPER          Room #:                     REG [facility]..#:      [id number]     Account #:      [id number]  
Admission:  12/13/19    Date of Birth:  [birth year]  
Discharge:                                              Report #:    [id number]
                                                        Path Case #: [id number]
_______________________________________________________________________________
- Mild focal acute colitis associated with fibrosis within lamina propria.
- Negative for dysplasia or malignancy.
- Negative for microscopic colitis.
.
G. Large intestinal mucosa, rectum, endoscopic biopsy:
- No significant diagnostic abnormalities present.
- Negative for acute colitis.
- Negative for microscopic colitis.
- Negative for dysplasia or malignancy.
LB  12/16/2019  1326 Local
**********************************************************************
.                                                                02
Comment:
Sections of the colonic mucosa designated "cecum, right colon and
transverse colon as well as left colon" show focal cryptitis, and a
moderately cellular lamina propria composed predominantly of lymphocytes
and plasma cells and occasional eosinophils.  Surface ulceration is not
identified.  There are no crypt abscesses, granulomas or viral inclusions.
The process affects all the fragments with a similar intensity.  Given
the description, the differential diagnosis includes focal acute
self-limited episode of colitis, resolving episode of colitis,
medication/drug induced colitis including bowel preparation as well as
laxative use, and acute diverticulitis.  Please correlate with clinical as
well as endoscopic findings.
(IUV/db; 12/16/2019)
.                                                                02
Electronically signed:                                     .
Vanda Hackett MD, Pathologist
NPI- [id number]
.                                                                01
Gross description:                                         .
A.  Received in formalin labeled "Sierra Cooper, small bowel BX due
to diarrhea rule out celiac" is a 1.5 x 0.6 x 0.1 cm aggregate of
tan-brown mucosa fragments. The specimen is submitted in A1.
.
B.  Received in formalin labeled "Sierra Cooper, BX of gastritis" is
a 1.3 x 0.5 x 0.1 cm aggregate of tan-brown mucosa fragments. The specimen
is submitted in B1.
.
C.  Received in formalin labeled "Sierra Cooper, polyp at ileocecal
valve" is a 0.5 x 0.5 x 0.1 cm aggregate of tan-brown mucosa fragments.
The specimen is submitted in C1.
.
D.  Received in formalin labeled "Sierra Cooper, random BX cecum
rule out colitis" is a 1.5 x 0.6 x 0.1 cm aggregate of tan-brown mucosa
fragments. The specimen is submitted in D1.
.
E.  Received in formalin labeled "Sierra Cooper, random BX of [facility]
[address] [place]   [address]                     PATHOLOGY RPT PROCEDURE       
_______________________________________________________________________________
 
Name:       SIERRA COOPER S          Room #:                     REG [facility].#:      [id number]     Account #:      [id number]  
Admission:  12/13/19    Date of Birth:  [birth year]  
Discharge:                                              Report #:    [id number]
                                                        Path Case #: [id number]
_______________________________________________________________________________
colon and transverse colon rule out colitis" is a 2.0 x 0.5 x 0.1 cm
aggregate of tan-brown mucosa fragments. The specimen is submitted in E1.
.
F.  Received in formalin labeled "Sierra Cooper, random BX of left
colon" is a 1.3 x 0.6 x 0.1 cm aggregate of tan-brown mucosa fragments.
The specimen is submitted in F1.
.
G.  Received in formalin labeled "Swapna [place], random BX of
rectum" is a 0.8 x 0.7 x 0.1 cm aggregate of tan-brown mucosa fragments.
The specimen is submitted in G1. ([facility] [place] [facility]; 12/14/2019)
[facility]  12/14/2019  0924 Local
.                                                                02
Pathologist provided ICD-10:
K29.50, K51.40, K52.9, R19.7
.                                                                02
CPT                                                        .
634546, 678676, 147085, 023325, 260572, 646369, 080236, H95941
Specimen Comment: A courtesy copy of this report has been sent to [phone]
Specimen Comment: Report sent to 
***Performed at:  01
   [address], [place]  [id number]
   MD Reji Pelletier MD Phone:  [phone]
***Performed at:  02
   [address]  [id number]
   MD Vanda Hackett MD Phone:  [phone]

## 2019-12-18 NOTE — P
[facility]
[address]
[place]   [address]                     PROCEDURE REPORT              
_______________________________________________________________________________
 
Name:       TYLOR COOPER          Room #:                     REG [facility]#:      [id number]                       Account #:      [id number]  
Admission:  12/13/19    Attend Phys:    Juan Edgar MD   
Discharge:              Date of Birth:  [birth year]  
                                                          Report #: [id number]
                                                                    [id number]   
_______________________________________________________________________________
THIS REPORT FOR:   //name//                          
 
CC: Juan De Oliveira MD
 
DATE OF SERVICE:  12/13/2019
 
 
BRIEF HISTORY:  The patient is an 84-year-old woman with reflux disease and also
increasing diarrhea symptoms.  She does have a history of diabetes.
 
PREOPERATIVE DIAGNOSES:  Reflux disease and diarrhea.
 
POSTOPERATIVE DIAGNOSIS:  Moderate-to-severe diffuse gastritis, antrum and body.
 
MEDICATIONS:  Deep sedation with propofol per anesthesia.
 
SPECIMENS:
1.  Small bowel biopsies to rule out celiac disease.
2.  Biopsies of gastritis.
 
ESTIMATED BLOOD LOSS:  3 mL.
 
PROCEDURE:  EGD with biopsy.
 
FINDINGS:  Prior to propofol sedation, the procedure of upper endoscopy was
discussed with the patient as well as potential risks and its complications. 
She indicates she understands and desires to proceed.
 
DESCRIPTION OF PROCEDURE:  With the patient in left lateral decubitus position,
the Olympus video endoscope was inserted in the cervical esophagus under direct
vision without difficulty.  Examination of this organ through its entire length
revealed normal esophageal mucosa down the squamocolumnar junction. 
Squamocolumnar junction was inspected and noted to be unremarkable.  No evidence
of ulcers, erosions or Rea mucosa.  Hiatus hernia was not seen.  Scope was
advanced in the stomach, was examined on end view as well as retroflexed views. 
There was a pattern of moderate diffuse gastritis with linear striations
throughout the antrum and body.  However, the mucosa was intact without ulcers
or erosions.  There was no endoscopic evidence of gastroparesis.  Upon
retroflexion, no mass lesions were seen.  The pylorus, duodenal bulb and
postbulbar duodenal sweep were inspected and noted to be unremarkable.  Multiple
random biopsies were obtained to evaluate for celiac disease.  At that point,
the scope was slowly withdrawn and careful circumferential views confirmed the
above findings.  The patient tolerated the procedure well.  In addition,
biopsies obtained of the gastric mucosa to evaluate for her gastritis.
 
 
 
[facility]
[address] [place]   [address]                     PROCEDURE REPORT              
_______________________________________________________________________________
 
Name:       TYLOR COOPER S          Room #:                     REG [facility].#:      [id number]                       Account #:      [id number]  
Admission:  12/13/19    Attend Phys:    Juan Edgar MD   
Discharge:              Date of Birth:  [birth year]  
                                                          Report #: [id number]
                                                                    [id number]   
_______________________________________________________________________________
 
CONDITION OF THE PATIENT UPON DISCHARGE:  Following procedure, the patient was
drowsy and prepared for colonoscopy.
 
INSTRUCTIONS TO THE PATIENT AND FAMILY AT THE TIME OF DISCHARGE:  With regards
to reflux disease, I do not see any evidence of active mucosal disease.  She may
use a PPI or H2 blocker as needed to control reflux symptoms.
 
We will follow up on biopsies with regard to her diarrhea and the possibly
celiac disease.  Proceed with colonoscopy.
 
 
 
 
 
 
 
 
 
 
 
 
 
 
 
 
 
 
 
 
 
 
 
 
 
 
 
 
 
 
 
 
 
 
  <ELECTRONICALLY SIGNED>
   By: Juan Edgar MD           
  12/18/19     1647
D: 12/13/19 1103                           _____________________________________
T: 12/13/19 2129                           Juan Edgar MD             /nt

## 2019-12-18 NOTE — P
[facility]
[address]
[place]   [address]                     PROCEDURE REPORT              
_______________________________________________________________________________
 
Name:       TYLOR COOPER          Room #:                     REG [facility]#:      [id number]                       Account #:      [id number]  
Admission:  12/13/19    Attend Phys:    Juan Edgar MD   
Discharge:              Date of Birth:  [birth year]  
                                                          Report #: [id number]
                                                                    [id number]   
_______________________________________________________________________________
THIS REPORT FOR:   //name//                          
 
CC: Juan De Oliveira MD
 
OUTPATIENT COLONOSCOPY REPORT
 
BRIEF HISTORY:  The patient is an 84-year-old woman with abnormal CT of the
abdomen revealing thickening of the colonic walls.  There is a prior history of
colitis.  She also has had worsening diarrhea.
 
PREOPERATIVE DIAGNOSIS:  Abnormal CT and diarrhea.
 
POSTOPERATIVE DIAGNOSES:
1.  Polyp, ileocecal valve.
2.  Moderate to severe diverticulosis coli.
3.  Tattoo juan m cecum without evidence of residual neoplastic disease.
 
MEDICATIONS:  Deep sedation with propofol per anesthesia.
 
SPECIMENS:
1.  Ileocecal valve polyp.
2.  Random biopsies, cecum, rule out colitis.
3.  Random biopsies, right colon and transverse colon, rule out colitis.
4.  Random biopsies, left colon, rule out colitis.
5.  Renal biopsies, rectum, rule out colitis.
 
ESTIMATED BLOOD LOSS:  3 mL.
 
PROCEDURE:  Colonoscopy to cecum with snare polypectomy and biopsy.
 
FINDINGS:  Prior to propofol sedation, procedure of colonoscopy discussed with
the patient as well as potential risks and its complications.  She indicates she
understands and desires to proceed.
 
DESCRIPTION OF PROCEDURE:  With the patient in left lateral decubitus position,
digital examination was completed, which revealed no abnormalities. 
Subsequently, the Olympus video colonoscope was introduced in the rectum,
advanced under direct vision to the cecum.  This was done with a minimal
difficulty.  The cecum was identified by the ileocecal valve and the appendiceal
orifice.  The cecum itself appeared to be somewhat smaller and irregular in
shape.  However, the overlying mucosa was completely normal without inflammatory
changes.  The ileocecal valve was seemed to be very proximal within the colon
and it was very difficult to see the ileocecal valve, it could only be seen
tangentially.  I could advance the tip of the scope into the mouth of ileocecal
 
 
 
[facility]
[address]
[place]   [address]                     PROCEDURE REPORT              
_______________________________________________________________________________
 
Name:       TYLOR COOPER          Room #:                     REG [facility] 
KATY.#:      [id number]                       Account #:      [id number]  
Admission:  12/13/19    Attend Phys:    Juan Edgar MD   
Discharge:              Date of Birth:  [birth year]  
                                                          Report #: [id number]
                                                                    [id number]   
_______________________________________________________________________________
valve and see villous pattern, but I could not deeply intubate the ileocecal
valve.  In addition on one aspect was a polypoid lesion in the range of about
5-6 mm.  It looks like an inflammatory polyp.  However, due to the angle, we
could not engage due to the snare.  It was essentially removed with jumbo biopsy
forceps.  We obtained random biopsies of the cecum.  In addition, there was an
orifice, which was somewhat generous in size, which may represent the
appendiceal orifice.  It is also possible this could be lumen with marked
narrowing.  However, it was very smooth and symmetrical and overall benign
appearance without any inflammatory changes.  In addition to the cecum, a small
tattoo was seen.  I did not see evidence of residual neoplastic tissue at that
point.  The scope was withdrawn through the remainder of the colon.  The mucosa
was within normal limits, normal vascular pattern, normal light reflex.  Random
biopsies were obtained.  An occasional diverticulum was seen upon withdrawal of
the scope in the proximal colon.  There was moderate diverticular disease in the
sigmoid colon without endoscopic evidence of diverticulitis.  Scope was
withdrawn in the rectum, no abnormalities were seen.  Upon retroflexion, no
abnormalities were seen.  In summary, there was no evidence of inflammatory
disease to explain her diarrhea.
 
CONDITION OF THE PATIENT UPON DISCHARGE:  Following procedure, the patient
drowsy.  She will be discharged home when fully ambulatory.
 
INSTRUCTIONS TO THE PATIENT AND FAMILY AT THE TIME OF DISCHARGE:  No
inflammatory changes seen.  We will follow up on the path.  It is noted she is
on multiple medications including I believe metformin, which may be problematic
with regards to her diarrhea,  Given the fact she has taken multiple medication,
Creon for possible post-cholecystectomy diarrhea and it is probably not a good
option.  She may use Imodium as needed for her diarrhea.  We will follow up on
biopsies and make further recommendations.
 
At this point in life, routine colonoscopy is likely to be of minimal benefit to
this patient.
 
 
 
 
 
 
 
 
 
 
 
 
  <ELECTRONICALLY SIGNED>
   By: Juan Edgar MD           
  12/18/19     1647
D: 12/13/19 1149                           _____________________________________
T: 12/13/19 2232                           Juan Edgar MD             /nt

## 2021-12-29 ENCOUNTER — HOSPITAL ENCOUNTER (OUTPATIENT)
Dept: HOSPITAL 35 - SJCVC | Age: 86
End: 2021-12-29
Attending: RADIOLOGY
Payer: COMMERCIAL

## 2021-12-29 DIAGNOSIS — K21.9: ICD-10-CM

## 2021-12-29 DIAGNOSIS — E11.9: ICD-10-CM

## 2021-12-29 DIAGNOSIS — Z95.5: ICD-10-CM

## 2021-12-29 DIAGNOSIS — Z88.0: ICD-10-CM

## 2021-12-29 DIAGNOSIS — Z79.84: ICD-10-CM

## 2021-12-29 DIAGNOSIS — I87.2: ICD-10-CM

## 2021-12-29 DIAGNOSIS — Z79.899: ICD-10-CM

## 2021-12-29 DIAGNOSIS — E78.5: ICD-10-CM

## 2021-12-29 DIAGNOSIS — Z88.2: ICD-10-CM

## 2021-12-29 DIAGNOSIS — J44.9: ICD-10-CM

## 2021-12-29 DIAGNOSIS — N18.9: ICD-10-CM

## 2021-12-29 DIAGNOSIS — I50.30: ICD-10-CM

## 2021-12-29 DIAGNOSIS — I73.9: Primary | ICD-10-CM

## 2021-12-29 DIAGNOSIS — Z88.8: ICD-10-CM

## 2021-12-29 DIAGNOSIS — I13.0: ICD-10-CM

## 2021-12-29 DIAGNOSIS — E78.00: ICD-10-CM

## 2021-12-29 DIAGNOSIS — Z88.5: ICD-10-CM

## 2021-12-29 DIAGNOSIS — Z87.891: ICD-10-CM
